# Patient Record
Sex: FEMALE | Race: BLACK OR AFRICAN AMERICAN | Employment: OTHER | URBAN - METROPOLITAN AREA
[De-identification: names, ages, dates, MRNs, and addresses within clinical notes are randomized per-mention and may not be internally consistent; named-entity substitution may affect disease eponyms.]

---

## 2017-07-12 ENCOUNTER — HOSPITAL ENCOUNTER (OUTPATIENT)
Dept: MRI IMAGING | Age: 62
Discharge: HOME | End: 2017-07-12

## 2017-07-12 DIAGNOSIS — M51.35 DDD (DEGENERATIVE DISC DISEASE), THORACOLUMBAR: ICD-10-CM

## 2017-07-12 DIAGNOSIS — M51.35 OTHER INTERVERTEBRAL DISC DEGENERATION, THORACOLUMBAR REGION: ICD-10-CM

## 2018-05-24 ENCOUNTER — OFFICE VISIT (OUTPATIENT)
Dept: FAMILY MEDICINE CLINIC | Age: 63
End: 2018-05-24

## 2018-05-24 VITALS
HEIGHT: 61 IN | HEART RATE: 106 BPM | WEIGHT: 177.6 LBS | OXYGEN SATURATION: 97 % | BODY MASS INDEX: 33.53 KG/M2 | DIASTOLIC BLOOD PRESSURE: 82 MMHG | SYSTOLIC BLOOD PRESSURE: 135 MMHG

## 2018-05-24 DIAGNOSIS — Z12.31 ENCOUNTER FOR SCREENING MAMMOGRAM FOR BREAST CANCER: ICD-10-CM

## 2018-05-24 DIAGNOSIS — R13.10 DYSPHAGIA, UNSPECIFIED TYPE: Primary | ICD-10-CM

## 2018-05-24 DIAGNOSIS — L30.9 DERMATITIS: ICD-10-CM

## 2018-05-24 DIAGNOSIS — Z13.220 LIPID SCREENING: ICD-10-CM

## 2018-05-24 DIAGNOSIS — F17.200 TOBACCO USE DISORDER: ICD-10-CM

## 2018-05-24 DIAGNOSIS — R53.83 FATIGUE, UNSPECIFIED TYPE: ICD-10-CM

## 2018-05-24 DIAGNOSIS — Z23 NEED FOR PNEUMOCOCCAL VACCINATION: ICD-10-CM

## 2018-05-24 PROCEDURE — 99203 OFFICE O/P NEW LOW 30 MIN: CPT | Performed by: FAMILY MEDICINE

## 2018-05-24 RX ORDER — OMEPRAZOLE 20 MG/1
20 CAPSULE, DELAYED RELEASE ORAL 2 TIMES DAILY
Qty: 30 CAPSULE | Refills: 3 | Status: SHIPPED | OUTPATIENT
Start: 2018-05-24 | End: 2018-09-17 | Stop reason: DRUGHIGH

## 2018-05-24 RX ORDER — TRIAMCINOLONE ACETONIDE 1 MG/G
CREAM TOPICAL
Qty: 60 G | Refills: 0 | Status: SHIPPED | OUTPATIENT
Start: 2018-05-24 | End: 2018-09-24

## 2018-05-24 ASSESSMENT — ENCOUNTER SYMPTOMS
BLOOD IN STOOL: 0
APNEA: 0
RHINORRHEA: 0
ABDOMINAL PAIN: 0
SINUS PRESSURE: 0
WHEEZING: 0
TROUBLE SWALLOWING: 1
VOMITING: 1
SORE THROAT: 0
SHORTNESS OF BREATH: 0
STRIDOR: 0
VOICE CHANGE: 0
DIARRHEA: 0
NAUSEA: 1
CONSTIPATION: 0
SINUS PAIN: 0
COUGH: 0

## 2018-05-24 ASSESSMENT — PATIENT HEALTH QUESTIONNAIRE - PHQ9
1. LITTLE INTEREST OR PLEASURE IN DOING THINGS: 0
SUM OF ALL RESPONSES TO PHQ9 QUESTIONS 1 & 2: 0
SUM OF ALL RESPONSES TO PHQ QUESTIONS 1-9: 0
2. FEELING DOWN, DEPRESSED OR HOPELESS: 0

## 2018-05-29 ENCOUNTER — HOSPITAL ENCOUNTER (OUTPATIENT)
Dept: GENERAL RADIOLOGY | Age: 63
Discharge: HOME | End: 2018-05-29
Attending: FAMILY MEDICINE | Admitting: FAMILY MEDICINE

## 2018-05-29 DIAGNOSIS — Z12.31 ENCOUNTER FOR SCREENING MAMMOGRAM FOR MALIGNANT NEOPLASM OF BREAST: ICD-10-CM

## 2018-05-29 DIAGNOSIS — R13.10 PROBLEMS WITH SWALLOWING AND MASTICATION: ICD-10-CM

## 2018-05-30 ENCOUNTER — HOSPITAL ENCOUNTER (OUTPATIENT)
Dept: WOMENS IMAGING | Age: 63
Discharge: HOME | End: 2018-05-30
Attending: FAMILY MEDICINE | Admitting: FAMILY MEDICINE

## 2018-05-30 DIAGNOSIS — Z12.31 ENCOUNTER FOR SCREENING MAMMOGRAM FOR MALIGNANT NEOPLASM OF BREAST: ICD-10-CM

## 2018-05-30 DIAGNOSIS — Z12.31 ENCOUNTER FOR SCREENING MAMMOGRAM FOR BREAST CANCER: ICD-10-CM

## 2018-06-01 ENCOUNTER — HOSPITAL ENCOUNTER (OUTPATIENT)
Dept: LAB | Age: 63
Discharge: HOME | End: 2018-06-01
Attending: FAMILY MEDICINE | Admitting: FAMILY MEDICINE

## 2018-06-01 LAB
ALBUMIN SERPL-MCNC: 4.3 GM/DL (ref 3.4–5)
ALP BLD-CCNC: 101 IU/L (ref 40–128)
ALT SERPL-CCNC: 11 U/L (ref 10–40)
ANION GAP SERPL CALCULATED.3IONS-SCNC: 17 MMOL/L (ref 4–16)
AST SERPL-CCNC: 27 IU/L (ref 15–37)
BASOPHILS ABSOLUTE: 0 K/CU MM
BASOPHILS RELATIVE PERCENT: 0.5 % (ref 0–1)
BILIRUB SERPL-MCNC: 0.7 MG/DL (ref 0–1)
BUN BLDV-MCNC: 10 MG/DL (ref 6–23)
CALCIUM SERPL-MCNC: 9.4 MG/DL (ref 8.3–10.6)
CHLORIDE BLD-SCNC: 101 MMOL/L (ref 99–110)
CHOLESTEROL: 243 MG/DL
CO2: 24 MMOL/L (ref 21–32)
CREAT SERPL-MCNC: 0.8 MG/DL (ref 0.6–1.1)
DIFFERENTIAL TYPE: ABNORMAL
EOSINOPHILS ABSOLUTE: 0.1 K/CU MM
EOSINOPHILS RELATIVE PERCENT: 1.2 % (ref 0–3)
GFR AFRICAN AMERICAN: >60 ML/MIN/1.73M2
GFR NON-AFRICAN AMERICAN: >60 ML/MIN/1.73M2
GLUCOSE BLD-MCNC: 99 MG/DL (ref 70–99)
HCT VFR BLD CALC: 28.4 % (ref 37–47)
HDLC SERPL-MCNC: 148 MG/DL
HEMOGLOBIN: 8.7 GM/DL (ref 12.5–16)
IMMATURE NEUTROPHIL %: 0.2 % (ref 0–0.43)
LDL CHOLESTEROL CALCULATED: 56 MG/DL
LYMPHOCYTES ABSOLUTE: 2.9 K/CU MM
LYMPHOCYTES RELATIVE PERCENT: 36.7 % (ref 24–44)
MCH RBC QN AUTO: 30.9 PG (ref 27–31)
MCHC RBC AUTO-ENTMCNC: 30.6 % (ref 32–36)
MCV RBC AUTO: 100.7 FL (ref 78–100)
MONOCYTES ABSOLUTE: 0.7 K/CU MM
MONOCYTES RELATIVE PERCENT: 8.6 % (ref 0–4)
NUCLEATED RBC %: 0 %
PDW BLD-RTO: 18.2 % (ref 11.7–14.9)
PLATELET # BLD: 503 K/CU MM (ref 140–440)
PMV BLD AUTO: 9.3 FL (ref 7.5–11.1)
POTASSIUM SERPL-SCNC: 3.5 MMOL/L (ref 3.5–5.1)
RBC # BLD: 2.82 M/CU MM (ref 4.2–5.4)
SEGMENTED NEUTROPHILS ABSOLUTE COUNT: 4.2 K/CU MM
SEGMENTED NEUTROPHILS RELATIVE PERCENT: 52.8 % (ref 36–66)
SODIUM BLD-SCNC: 142 MMOL/L (ref 135–145)
T4 FREE: 1 NG/DL (ref 0.9–1.8)
TOTAL IMMATURE NEUTOROPHIL: 0.02 K/CU MM
TOTAL NUCLEATED RBC: 0 K/CU MM
TOTAL PROTEIN: 7.2 GM/DL (ref 6.4–8.2)
TRIGL SERPL-MCNC: 196 MG/DL
TSH HIGH SENSITIVITY: 2.71 UIU/ML (ref 0.27–4.2)
WBC # BLD: 8 K/CU MM (ref 4–10.5)

## 2018-06-05 ENCOUNTER — OFFICE VISIT (OUTPATIENT)
Dept: FAMILY MEDICINE CLINIC | Age: 63
End: 2018-06-05

## 2018-06-05 VITALS
DIASTOLIC BLOOD PRESSURE: 98 MMHG | OXYGEN SATURATION: 98 % | BODY MASS INDEX: 33.37 KG/M2 | SYSTOLIC BLOOD PRESSURE: 172 MMHG | HEART RATE: 93 BPM | WEIGHT: 176.6 LBS

## 2018-06-05 DIAGNOSIS — Z12.11 SCREEN FOR COLON CANCER: ICD-10-CM

## 2018-06-05 DIAGNOSIS — I10 ESSENTIAL HYPERTENSION: ICD-10-CM

## 2018-06-05 DIAGNOSIS — D64.9 ANEMIA, UNSPECIFIED TYPE: ICD-10-CM

## 2018-06-05 DIAGNOSIS — M79.671 FOOT PAIN, BILATERAL: Primary | ICD-10-CM

## 2018-06-05 DIAGNOSIS — M79.672 FOOT PAIN, BILATERAL: Primary | ICD-10-CM

## 2018-06-05 DIAGNOSIS — M72.2 PLANTAR FASCIITIS, BILATERAL: ICD-10-CM

## 2018-06-05 PROCEDURE — 99214 OFFICE O/P EST MOD 30 MIN: CPT | Performed by: FAMILY MEDICINE

## 2018-06-05 RX ORDER — OMEPRAZOLE 40 MG/1
40 CAPSULE, DELAYED RELEASE ORAL DAILY
COMMUNITY
Start: 2018-05-29 | End: 2018-10-04 | Stop reason: SDUPTHER

## 2018-06-05 RX ORDER — HYDROCHLOROTHIAZIDE 25 MG/1
25 TABLET ORAL DAILY
Qty: 30 TABLET | Refills: 3 | Status: SHIPPED | OUTPATIENT
Start: 2018-06-05 | End: 2019-01-07

## 2018-06-10 ASSESSMENT — ENCOUNTER SYMPTOMS
COUGH: 0
SHORTNESS OF BREATH: 0

## 2018-09-17 PROBLEM — J18.9 PNEUMONIA: Status: ACTIVE | Noted: 2018-09-17

## 2018-09-18 ENCOUNTER — TELEPHONE (OUTPATIENT)
Dept: FAMILY MEDICINE CLINIC | Age: 63
End: 2018-09-18

## 2018-09-18 PROBLEM — G93.41 METABOLIC ENCEPHALOPATHY: Status: ACTIVE | Noted: 2018-09-18

## 2018-09-18 PROBLEM — J18.9 PNEUMONIA: Status: RESOLVED | Noted: 2018-09-17 | Resolved: 2018-09-18

## 2018-09-18 PROBLEM — E72.20 HYPERAMMONEMIA (HCC): Status: ACTIVE | Noted: 2018-09-18

## 2018-09-18 PROBLEM — N39.0 COMPLICATED UTI (URINARY TRACT INFECTION): Status: ACTIVE | Noted: 2018-09-18

## 2018-09-19 PROBLEM — G93.41 METABOLIC ENCEPHALOPATHY: Status: RESOLVED | Noted: 2018-09-18 | Resolved: 2018-09-19

## 2018-09-19 PROBLEM — E72.20 HYPERAMMONEMIA (HCC): Status: RESOLVED | Noted: 2018-09-18 | Resolved: 2018-09-19

## 2018-09-19 PROBLEM — J18.9 PNEUMONIA: Status: ACTIVE | Noted: 2018-09-19

## 2018-09-24 ENCOUNTER — APPOINTMENT (OUTPATIENT)
Dept: GENERAL RADIOLOGY | Age: 63
DRG: 689 | End: 2018-09-24
Payer: MEDICARE

## 2018-09-24 ENCOUNTER — APPOINTMENT (OUTPATIENT)
Dept: CT IMAGING | Age: 63
DRG: 689 | End: 2018-09-24
Payer: MEDICARE

## 2018-09-24 ENCOUNTER — HOSPITAL ENCOUNTER (INPATIENT)
Age: 63
LOS: 4 days | Discharge: HOME | DRG: 689 | End: 2018-09-28
Attending: EMERGENCY MEDICINE | Admitting: INTERNAL MEDICINE
Payer: MEDICARE

## 2018-09-24 DIAGNOSIS — R41.82 ALTERED MENTAL STATUS, UNSPECIFIED ALTERED MENTAL STATUS TYPE: Primary | ICD-10-CM

## 2018-09-24 DIAGNOSIS — E83.42 HYPOMAGNESEMIA: ICD-10-CM

## 2018-09-24 DIAGNOSIS — E87.20 LACTIC ACID ACIDOSIS: ICD-10-CM

## 2018-09-24 PROBLEM — G93.40 ENCEPHALOPATHY ACUTE: Status: ACTIVE | Noted: 2018-09-24

## 2018-09-24 LAB
ALBUMIN SERPL-MCNC: 3.7 GM/DL (ref 3.4–5)
ALCOHOL SCREEN SERUM: <0.01 %WT/VOL
ALP BLD-CCNC: 105 IU/L (ref 40–129)
ALT SERPL-CCNC: 33 U/L (ref 10–40)
AMMONIA: 30 UMOL/L (ref 11–51)
AMPHETAMINES: NEGATIVE
ANION GAP SERPL CALCULATED.3IONS-SCNC: 18 MMOL/L (ref 4–16)
AST SERPL-CCNC: 58 IU/L (ref 15–37)
BACTERIA: ABNORMAL /HPF
BARBITURATE SCREEN URINE: NEGATIVE
BASE EXCESS: 2 (ref 0–2.4)
BASOPHILS ABSOLUTE: 0.1 K/CU MM
BASOPHILS RELATIVE PERCENT: 0.5 % (ref 0–1)
BENZODIAZEPINE SCREEN, URINE: NEGATIVE
BILIRUB SERPL-MCNC: 0.6 MG/DL (ref 0–1)
BILIRUBIN URINE: NEGATIVE MG/DL
BLOOD, URINE: NEGATIVE
BUN BLDV-MCNC: 9 MG/DL (ref 6–23)
CALCIUM SERPL-MCNC: 9.2 MG/DL (ref 8.3–10.6)
CANNABINOID SCREEN URINE: NEGATIVE
CARBON MONOXIDE, BLOOD: 2.1 % (ref 0–5)
CHLORIDE BLD-SCNC: 95 MMOL/L (ref 99–110)
CLARITY: ABNORMAL
CO2 CONTENT: 22.9 MMOL/L (ref 19–24)
CO2: 21 MMOL/L (ref 21–32)
COCAINE METABOLITE: NEGATIVE
COLOR: YELLOW
CREAT SERPL-MCNC: 1.3 MG/DL (ref 0.6–1.1)
DIFFERENTIAL TYPE: ABNORMAL
EOSINOPHILS ABSOLUTE: 0.3 K/CU MM
EOSINOPHILS RELATIVE PERCENT: 3 % (ref 0–3)
GFR AFRICAN AMERICAN: 50 ML/MIN/1.73M2
GFR NON-AFRICAN AMERICAN: 42 ML/MIN/1.73M2
GLUCOSE BLD-MCNC: 118 MG/DL (ref 70–99)
GLUCOSE, URINE: NEGATIVE MG/DL
HCO3 ARTERIAL: 21.9 MMOL/L (ref 18–23)
HCT VFR BLD CALC: 31.3 % (ref 37–47)
HEMOGLOBIN: 10.6 GM/DL (ref 12.5–16)
HYALINE CASTS: 2 /LPF
IMMATURE NEUTROPHIL %: 0.8 % (ref 0–0.43)
INR BLD: 1.07 INDEX
KETONES, URINE: NEGATIVE MG/DL
LACTATE: 1.3 MMOL/L (ref 0.4–2)
LACTATE: 2.3 MMOL/L (ref 0.4–2)
LACTATE: 2.4 MMOL/L (ref 0.4–2)
LEUKOCYTE ESTERASE, URINE: ABNORMAL
LYMPHOCYTES ABSOLUTE: 1.1 K/CU MM
LYMPHOCYTES RELATIVE PERCENT: 10.9 % (ref 24–44)
MAGNESIUM: 1.3 MG/DL (ref 1.8–2.4)
MCH RBC QN AUTO: 32.6 PG (ref 27–31)
MCHC RBC AUTO-ENTMCNC: 33.9 % (ref 32–36)
MCV RBC AUTO: 96.3 FL (ref 78–100)
METHEMOGLOBIN ARTERIAL: 0.7 %
MONOCYTES ABSOLUTE: 0.8 K/CU MM
MONOCYTES RELATIVE PERCENT: 8.2 % (ref 0–4)
MUCUS: ABNORMAL HPF
NITRITE URINE, QUANTITATIVE: NEGATIVE
NUCLEATED RBC %: 0 %
O2 SATURATION: 95.9 % (ref 96–97)
OPIATES, URINE: NEGATIVE
OXYCODONE: NEGATIVE
PCO2 ARTERIAL: 33 MMHG (ref 32–45)
PDW BLD-RTO: 14.3 % (ref 11.7–14.9)
PH BLOOD: 7.43 (ref 7.34–7.45)
PH, URINE: 5 (ref 5–8)
PHENCYCLIDINE, URINE: NEGATIVE
PLATELET # BLD: 352 K/CU MM (ref 140–440)
PMV BLD AUTO: 10.2 FL (ref 7.5–11.1)
PO2 ARTERIAL: 80 MMHG (ref 75–100)
POTASSIUM SERPL-SCNC: 3.6 MMOL/L (ref 3.5–5.1)
PROTEIN UA: 30 MG/DL
PROTHROMBIN TIME: 12.2 SECONDS (ref 9.12–12.5)
RBC # BLD: 3.25 M/CU MM (ref 4.2–5.4)
RBC URINE: <1 /HPF (ref 0–6)
SEGMENTED NEUTROPHILS ABSOLUTE COUNT: 7.8 K/CU MM
SEGMENTED NEUTROPHILS RELATIVE PERCENT: 76.6 % (ref 36–66)
SODIUM BLD-SCNC: 134 MMOL/L (ref 135–145)
SPECIFIC GRAVITY UA: 1.01 (ref 1–1.03)
SQUAMOUS EPITHELIAL: 14 /HPF
TOTAL IMMATURE NEUTOROPHIL: 0.08 K/CU MM
TOTAL NUCLEATED RBC: 0 K/CU MM
TOTAL PROTEIN: 7.3 GM/DL (ref 6.4–8.2)
TRICHOMONAS: ABNORMAL /HPF
TROPONIN T: <0.01 NG/ML
TROPONIN T: <0.01 NG/ML
UROBILINOGEN, URINE: NORMAL MG/DL (ref 0.2–1)
WBC # BLD: 10.2 K/CU MM (ref 4–10.5)
WBC UA: 3 /HPF (ref 0–5)

## 2018-09-24 PROCEDURE — 94761 N-INVAS EAR/PLS OXIMETRY MLT: CPT

## 2018-09-24 PROCEDURE — 2500000003 HC RX 250 WO HCPCS: Performed by: EMERGENCY MEDICINE

## 2018-09-24 PROCEDURE — 83735 ASSAY OF MAGNESIUM: CPT

## 2018-09-24 PROCEDURE — 85610 PROTHROMBIN TIME: CPT

## 2018-09-24 PROCEDURE — 2580000003 HC RX 258: Performed by: EMERGENCY MEDICINE

## 2018-09-24 PROCEDURE — 83605 ASSAY OF LACTIC ACID: CPT

## 2018-09-24 PROCEDURE — 87040 BLOOD CULTURE FOR BACTERIA: CPT

## 2018-09-24 PROCEDURE — 96366 THER/PROPH/DIAG IV INF ADDON: CPT

## 2018-09-24 PROCEDURE — 36415 COLL VENOUS BLD VENIPUNCTURE: CPT

## 2018-09-24 PROCEDURE — 96365 THER/PROPH/DIAG IV INF INIT: CPT

## 2018-09-24 PROCEDURE — 93005 ELECTROCARDIOGRAM TRACING: CPT | Performed by: EMERGENCY MEDICINE

## 2018-09-24 PROCEDURE — 70450 CT HEAD/BRAIN W/O DYE: CPT

## 2018-09-24 PROCEDURE — 85025 COMPLETE CBC W/AUTO DIFF WBC: CPT

## 2018-09-24 PROCEDURE — 81001 URINALYSIS AUTO W/SCOPE: CPT

## 2018-09-24 PROCEDURE — 71046 X-RAY EXAM CHEST 2 VIEWS: CPT

## 2018-09-24 PROCEDURE — 96375 TX/PRO/DX INJ NEW DRUG ADDON: CPT

## 2018-09-24 PROCEDURE — G0480 DRUG TEST DEF 1-7 CLASSES: HCPCS

## 2018-09-24 PROCEDURE — 99285 EMERGENCY DEPT VISIT HI MDM: CPT

## 2018-09-24 PROCEDURE — 36600 WITHDRAWAL OF ARTERIAL BLOOD: CPT

## 2018-09-24 PROCEDURE — 6360000002 HC RX W HCPCS: Performed by: EMERGENCY MEDICINE

## 2018-09-24 PROCEDURE — 82140 ASSAY OF AMMONIA: CPT

## 2018-09-24 PROCEDURE — 82803 BLOOD GASES ANY COMBINATION: CPT

## 2018-09-24 PROCEDURE — 1200000000 HC SEMI PRIVATE

## 2018-09-24 PROCEDURE — 2580000003 HC RX 258: Performed by: NURSE PRACTITIONER

## 2018-09-24 PROCEDURE — 84484 ASSAY OF TROPONIN QUANT: CPT

## 2018-09-24 PROCEDURE — 80053 COMPREHEN METABOLIC PANEL: CPT

## 2018-09-24 PROCEDURE — 6360000002 HC RX W HCPCS: Performed by: NURSE PRACTITIONER

## 2018-09-24 PROCEDURE — 6370000000 HC RX 637 (ALT 250 FOR IP): Performed by: NURSE PRACTITIONER

## 2018-09-24 RX ORDER — POTASSIUM CHLORIDE 7.45 MG/ML
10 INJECTION INTRAVENOUS PRN
Status: DISCONTINUED | OUTPATIENT
Start: 2018-09-24 | End: 2018-09-28 | Stop reason: HOSPADM

## 2018-09-24 RX ORDER — ACETAMINOPHEN 325 MG/1
650 TABLET ORAL EVERY 4 HOURS PRN
Status: DISCONTINUED | OUTPATIENT
Start: 2018-09-24 | End: 2018-09-28 | Stop reason: HOSPADM

## 2018-09-24 RX ORDER — MULTIVITAMIN WITH FOLIC ACID 400 MCG
1 TABLET ORAL DAILY
Status: DISCONTINUED | OUTPATIENT
Start: 2018-09-24 | End: 2018-09-28 | Stop reason: HOSPADM

## 2018-09-24 RX ORDER — SODIUM CHLORIDE 9 MG/ML
INJECTION, SOLUTION INTRAVENOUS CONTINUOUS
Status: DISCONTINUED | OUTPATIENT
Start: 2018-09-24 | End: 2018-09-28 | Stop reason: HOSPADM

## 2018-09-24 RX ORDER — MAGNESIUM SULFATE 1 G/100ML
1 INJECTION INTRAVENOUS PRN
Status: DISCONTINUED | OUTPATIENT
Start: 2018-09-24 | End: 2018-09-28 | Stop reason: HOSPADM

## 2018-09-24 RX ORDER — ONDANSETRON 2 MG/ML
4 INJECTION INTRAMUSCULAR; INTRAVENOUS EVERY 6 HOURS PRN
Status: DISCONTINUED | OUTPATIENT
Start: 2018-09-24 | End: 2018-09-28 | Stop reason: HOSPADM

## 2018-09-24 RX ORDER — LORAZEPAM 2 MG/ML
0.5 INJECTION INTRAMUSCULAR EVERY 8 HOURS PRN
Status: DISCONTINUED | OUTPATIENT
Start: 2018-09-24 | End: 2018-09-28 | Stop reason: HOSPADM

## 2018-09-24 RX ORDER — PANTOPRAZOLE SODIUM 40 MG/1
40 TABLET, DELAYED RELEASE ORAL
Status: DISCONTINUED | OUTPATIENT
Start: 2018-09-25 | End: 2018-09-28 | Stop reason: HOSPADM

## 2018-09-24 RX ORDER — POTASSIUM CHLORIDE 20MEQ/15ML
40 LIQUID (ML) ORAL PRN
Status: DISCONTINUED | OUTPATIENT
Start: 2018-09-24 | End: 2018-09-28 | Stop reason: HOSPADM

## 2018-09-24 RX ORDER — DOCUSATE SODIUM 100 MG/1
100 CAPSULE, LIQUID FILLED ORAL 2 TIMES DAILY
Status: DISCONTINUED | OUTPATIENT
Start: 2018-09-24 | End: 2018-09-28 | Stop reason: HOSPADM

## 2018-09-24 RX ORDER — SODIUM CHLORIDE 0.9 % (FLUSH) 0.9 %
10 SYRINGE (ML) INJECTION PRN
Status: DISCONTINUED | OUTPATIENT
Start: 2018-09-24 | End: 2018-09-28 | Stop reason: HOSPADM

## 2018-09-24 RX ORDER — MAGNESIUM SULFATE IN WATER 40 MG/ML
2 INJECTION, SOLUTION INTRAVENOUS ONCE
Status: COMPLETED | OUTPATIENT
Start: 2018-09-24 | End: 2018-09-24

## 2018-09-24 RX ORDER — THIAMINE MONONITRATE (VIT B1) 100 MG
100 TABLET ORAL DAILY
Status: DISCONTINUED | OUTPATIENT
Start: 2018-09-24 | End: 2018-09-28 | Stop reason: HOSPADM

## 2018-09-24 RX ORDER — THIAMINE HYDROCHLORIDE 100 MG/ML
100 INJECTION, SOLUTION INTRAMUSCULAR; INTRAVENOUS ONCE
Status: COMPLETED | OUTPATIENT
Start: 2018-09-24 | End: 2018-09-24

## 2018-09-24 RX ORDER — POTASSIUM CHLORIDE 20 MEQ/1
40 TABLET, EXTENDED RELEASE ORAL PRN
Status: DISCONTINUED | OUTPATIENT
Start: 2018-09-24 | End: 2018-09-28 | Stop reason: HOSPADM

## 2018-09-24 RX ORDER — FOLIC ACID 5 MG/ML
1 INJECTION, SOLUTION INTRAMUSCULAR; INTRAVENOUS; SUBCUTANEOUS ONCE
Status: COMPLETED | OUTPATIENT
Start: 2018-09-24 | End: 2018-09-24

## 2018-09-24 RX ORDER — SODIUM CHLORIDE 0.9 % (FLUSH) 0.9 %
10 SYRINGE (ML) INJECTION EVERY 12 HOURS SCHEDULED
Status: DISCONTINUED | OUTPATIENT
Start: 2018-09-24 | End: 2018-09-28 | Stop reason: HOSPADM

## 2018-09-24 RX ORDER — FOLIC ACID 1 MG/1
1 TABLET ORAL DAILY
Status: DISCONTINUED | OUTPATIENT
Start: 2018-09-24 | End: 2018-09-28 | Stop reason: HOSPADM

## 2018-09-24 RX ORDER — 0.9 % SODIUM CHLORIDE 0.9 %
1000 INTRAVENOUS SOLUTION INTRAVENOUS ONCE
Status: COMPLETED | OUTPATIENT
Start: 2018-09-24 | End: 2018-09-24

## 2018-09-24 RX ADMIN — FOLIC ACID 1 MG: 1 TABLET ORAL at 17:53

## 2018-09-24 RX ADMIN — Medication 100 MG: at 17:53

## 2018-09-24 RX ADMIN — SODIUM CHLORIDE: 9 INJECTION, SOLUTION INTRAVENOUS at 17:51

## 2018-09-24 RX ADMIN — MAGNESIUM SULFATE HEPTAHYDRATE 2 G: 40 INJECTION, SOLUTION INTRAVENOUS at 12:47

## 2018-09-24 RX ADMIN — THERA TABS 1 TABLET: TAB at 17:53

## 2018-09-24 RX ADMIN — THIAMINE HYDROCHLORIDE 100 MG: 100 INJECTION, SOLUTION INTRAMUSCULAR; INTRAVENOUS at 12:47

## 2018-09-24 RX ADMIN — ACETAMINOPHEN 650 MG: 325 TABLET ORAL at 17:53

## 2018-09-24 RX ADMIN — DOCUSATE SODIUM 100 MG: 100 CAPSULE, LIQUID FILLED ORAL at 20:16

## 2018-09-24 RX ADMIN — ENOXAPARIN SODIUM 40 MG: 40 INJECTION SUBCUTANEOUS at 17:53

## 2018-09-24 RX ADMIN — FOLIC ACID 1 MG: 5 INJECTION, SOLUTION INTRAMUSCULAR; INTRAVENOUS; SUBCUTANEOUS at 12:43

## 2018-09-24 RX ADMIN — SODIUM CHLORIDE 1000 ML: 900 INJECTION INTRAVENOUS at 12:23

## 2018-09-24 ASSESSMENT — PAIN DESCRIPTION - DESCRIPTORS
DESCRIPTORS: ACHING
DESCRIPTORS: ACHING

## 2018-09-24 ASSESSMENT — PAIN SCALES - GENERAL
PAINLEVEL_OUTOF10: 4
PAINLEVEL_OUTOF10: 0
PAINLEVEL_OUTOF10: 5
PAINLEVEL_OUTOF10: 3

## 2018-09-24 ASSESSMENT — PAIN DESCRIPTION - PAIN TYPE
TYPE: ACUTE PAIN
TYPE: ACUTE PAIN

## 2018-09-24 ASSESSMENT — PAIN DESCRIPTION - LOCATION
LOCATION: LEG
LOCATION: LEG

## 2018-09-24 ASSESSMENT — PAIN DESCRIPTION - ORIENTATION
ORIENTATION: RIGHT;LEFT
ORIENTATION: RIGHT;LEFT

## 2018-09-24 ASSESSMENT — PAIN DESCRIPTION - FREQUENCY
FREQUENCY: CONTINUOUS
FREQUENCY: CONTINUOUS

## 2018-09-24 NOTE — ED PROVIDER NOTES
rash  Neurologic:  Alert & oriented. Vision is intact. Pupils are equal, round and reactive to light bilaterally, EOM are intact, face is symmetrical, tongue is midline. Motor function and sensation are grossly intact. There is no pronator drift. Finger to nose is normal. Lower extremity reflexes are +2. No trunchal ataxia.  Speech is normal.  No asterixis  Psychiatric:  Affect normal, Mood normal.       Labs:  Labs Reviewed   CBC WITH AUTO DIFFERENTIAL - Abnormal; Notable for the following:        Result Value    RBC 3.25 (*)     Hemoglobin 10.6 (*)     Hematocrit 31.3 (*)     MCH 32.6 (*)     Segs Relative 76.6 (*)     Lymphocytes % 10.9 (*)     Monocytes % 8.2 (*)     Immature Neutrophil % 0.8 (*)     All other components within normal limits   COMPREHENSIVE METABOLIC PANEL - Abnormal; Notable for the following:     Sodium 134 (*)     Chloride 95 (*)     CREATININE 1.3 (*)     Glucose 118 (*)     AST 58 (*)     GFR Non- 42 (*)     GFR  50 (*)     Anion Gap 18 (*)     All other components within normal limits   URINALYSIS - Abnormal; Notable for the following:     Clarity, UA HAZY (*)     Protein, UA 30 (*)     Leukocyte Esterase, Urine TRACE (*)     Bacteria, UA OCCASIONAL (*)     Mucus, UA RARE (*)     All other components within normal limits   LACTIC ACID, PLASMA - Abnormal; Notable for the following:     Lactate 2.3 (*)     All other components within normal limits    Narrative:     [facility] CALLED TO Matt Kim RN ON 09/24/2018 @ 1137 BY PAMELLA LEACH  RESULTS READ BACK   MAGNESIUM - Abnormal; Notable for the following:     Magnesium 1.3 (*)     All other components within normal limits   LACTIC ACID, PLASMA - Abnormal; Notable for the following:     Lactate 2.4 (*)     All other components within normal limits   CULTURE BLOOD #1   CULTURE BLOOD #1   TROPONIN    Narrative:     (NOTE)  PATIENTS WITH HIGH LEVELS OF BIOTIN ORAL INTAKE (ie >5mg/day) MAY  HAVE FALSELY DECREASED TROPONIN T LEVELS. SAMPLES COLLECETED  WITHIN 8 HOURS OF BIOTIN INTAKE MAY REQUIRE ADDITIONAL  INFORMATION FOR DIAGNOSIS. ETHANOL    Narrative:     THE VALUE IS BELOW OUR DETECTION LIMIT. PROTIME-INR    Narrative:     Protime seconds can vary  due to reagent sensitivity. Please use INR to monitor  oral anticoagulants. (NOTE)  Therapeutic Range                                              Indications:                                                INR  Most (DVT, PE, Atrial Fibrillation, Bioprosthetic         2.0-3.0  valve, [place] Judes bicuspid aortic valve)    Most mechanical valves, recurrent thrombosis              2.5-3.5     AMMONIA   URINE DRUG SCREEN    Narrative:             THRESHOLD CONCENTRATIONS (mg/dL)  AMPHT               1000  LOUIE,OPIA             300  BZO,BAR              200  PCP                   25  THC                   50  OXY                  100          IF POSITIVE, SPECIMEN WILL BE  DISCARDED AFTER 6 MONTHS. CALL LAB IF CONFIRMATION NEEDED. ALL NEGATIVE SPECIMENS WILL BE  DISCARDED AFTER ONE WEEK. * UNCONFIRMED POSITIVES MAY  NOT MEET FORENSIC REQUIREMENTS. LACTIC ACID, PLASMA         EKG    This EKG was interpreted by me. Rate is 113, rhythm is sinus. Occasional PACs. WV and QT intervals are within normal limits. QTC is 499. There is no ST segment or T wave changes. This EKG was compared to previous EKG from date 9/17/18    RADIOLOGY    Xr Chest Standard (2 Vw)    Result Date: 9/24/2018  EXAMINATION: TWO VIEWS OF THE CHEST 9/24/2018 11:01 am COMPARISON: Prior exams dating back to at least 2012 HISTORY: [facility] [place]: Chest pain TECHNOLOGIST PROVIDED HISTORY: Reason for exam:->Chest pain Ordering Physician Provided Reason for Exam: increased confusion Acuity: Chronic Type of Exam: Ongoing FINDINGS: The lungs are without acute focal process. There is no effusion or pneumothorax. The cardiomediastinal silhouette is without acute process.  The osseous

## 2018-09-24 NOTE — H&P
History and Physical      Name:  Channing Felton /Age/Sex: 1955  (59 y.o. female)   MRN & CSN:  [id number] & [id number] Admission Date/Time: 2018 10:25 AM   Location:  ED[facility]/ED-[facility] PCP: Lucky Apgar, MD       Hospital Day: 1        Admitting Physician: Dr. Kathy Berry MD.     Assessment and Plan:   Channing Felton is a 59 y.o. female who presents with  Altered Mental Status; Fall (daughter at bedside reports pt fell in parking lot ); and Abrasion (right knee s/p fall)     Encephalopathy acute- resolved at present evaluation. Sshe presents tachycardic(110s)/ hypotensive,lactic acidosis (2.4)- concern for infectious etiology given recent UTI with incomplete treatment. CT head non acute. CXR non acute. Neg UDS. Alcohol withdrawal also consideration. Admit to Med/Surg under observation    Neuro checks   Pending repeat cultures    Repeat lactic acid   CIWA scale- notify if scoring. Will give TID prn ativan for now. Pending ABG     Essential hypertension- Holding as currently hypotensive. Monitor BP trends resume as tolerated. Diet Cardiac   DVT Prophylaxis [x] Lovenox, []  Heparin, [] SCDs, [] Ambulation  [] Long term AC   GI Prophylaxis [x] PPI,  [] H2 Blocker,  [] Carafate,  [] Diet/Tube Feeds   Code Status Full     Disposition Admit to observation . Patient plans to return home upon discharge   MDM [] Low, [x] Moderate,[]  High     -Patient assessment and plan discussed with supervising physician-  History of Present Illness:     Chief Complaint: Altered Mental Status; Fall (daughter at bedside reports pt fell in parking lot ); and Abrasion (right knee s/p fall)    Channing Felton is a 59 y.o. female who presents with daughter who is concerned about waxing and waning altered mental status. She states that after discharge from this facility on () r/t treatment for AMS 2/2 UTI she had several episodes.  Describes one episode that with neighbors assistance she was given medication including Levaquin but after 2 days medications disappeared to which the patient reported she took them all. The patient states she feels fine. States \"I know I catch myself saying things sometimes\". The patient's daughter states she has hx of alcohol abuse with previous severe withdrawal symptoms requiring intubation. States the patient had alcohol over weekend and none since then- states low concern but wanted to mention. Another symptom described was intermittent dizziness. The patient states several episodes since discharge. Reports random occurrence requiring her to sit down. Ten point ROS: reviewed negative, unless as noted in above HPI. Objective:   No intake or output data in the 24 hours ending 09/24/18 1509     Vitals:   Vitals:    09/24/18 1333 09/24/18 1402 09/24/18 1502 09/24/18 1507   BP: 107/74 108/66 (!) 90/59 96/62   Pulse: 107 104 105 106   Resp: 17 20 16 18   Temp:       TempSrc:       SpO2: 100% 100% 100% 98%   Weight:       Height:           Physical Exam: 09/24/18     GEN -Awake nontoxic appearing female, sitting upright in bed , NAD. obese body habitus. Appears given age. EYES -Pupils are equally round. No scleral erythema, discharge, or conjunctivitis. HENT -MM are moist. Oral pharynx without exudates, no evidence of thrush. NECK -Supple, no apparent thyromegaly or masses. RESP -CTA, no wheezes, rales or rhonchi. Symmetric chest movement while on RA.  C/V -S1/S2 auscultated. RRR without appreciable M/R/G. No JVD or carotid bruits. Peripheral pulses equal bilaterally and palpable. No peripheral edema. GI -Abdomen is soft non distended and without significant tenderness. No masses or guarding. + BS Rectal exam deferred.  -No CVA tenderness. James catheter is not present. LYMPH-No palpable cervical lymphadenopathy and no hepatosplenomegaly. No petechiae or ecchymoses. MS -No gross joint deformities. SKIN -Normal coloration, warm, dry.   NEURO-Cranial nerves appear grossly

## 2018-09-25 LAB
ALBUMIN SERPL-MCNC: 3.1 GM/DL (ref 3.4–5)
ALP BLD-CCNC: 99 IU/L (ref 40–128)
ALT SERPL-CCNC: 27 U/L (ref 10–40)
AMYLASE: 46 U/L (ref 25–115)
ANION GAP SERPL CALCULATED.3IONS-SCNC: 16 MMOL/L (ref 4–16)
AST SERPL-CCNC: 56 IU/L (ref 15–37)
BASOPHILS ABSOLUTE: 0 K/CU MM
BASOPHILS RELATIVE PERCENT: 0.1 % (ref 0–1)
BILIRUB SERPL-MCNC: 0.4 MG/DL (ref 0–1)
BUN BLDV-MCNC: 16 MG/DL (ref 6–23)
CALCIUM SERPL-MCNC: 8.3 MG/DL (ref 8.3–10.6)
CHLORIDE BLD-SCNC: 103 MMOL/L (ref 99–110)
CO2: 23 MMOL/L (ref 21–32)
CREAT SERPL-MCNC: 1.5 MG/DL (ref 0.6–1.1)
DIFFERENTIAL TYPE: ABNORMAL
EOSINOPHILS ABSOLUTE: 0.1 K/CU MM
EOSINOPHILS RELATIVE PERCENT: 0.9 % (ref 0–3)
GFR AFRICAN AMERICAN: 43 ML/MIN/1.73M2
GFR NON-AFRICAN AMERICAN: 35 ML/MIN/1.73M2
GLUCOSE BLD-MCNC: 111 MG/DL (ref 70–99)
HCT VFR BLD CALC: 25.4 % (ref 37–47)
HEMOGLOBIN: 8.3 GM/DL (ref 12.5–16)
IMMATURE NEUTROPHIL %: 0.4 % (ref 0–0.43)
LIPASE: 58 IU/L (ref 13–60)
LYMPHOCYTES ABSOLUTE: 0.5 K/CU MM
LYMPHOCYTES RELATIVE PERCENT: 3.7 % (ref 24–44)
MAGNESIUM: 1.6 MG/DL (ref 1.8–2.4)
MCH RBC QN AUTO: 31.7 PG (ref 27–31)
MCHC RBC AUTO-ENTMCNC: 32.7 % (ref 32–36)
MCV RBC AUTO: 96.9 FL (ref 78–100)
MONOCYTES ABSOLUTE: 0.6 K/CU MM
MONOCYTES RELATIVE PERCENT: 4.6 % (ref 0–4)
NUCLEATED RBC %: 0 %
PDW BLD-RTO: 14.3 % (ref 11.7–14.9)
PHOSPHORUS: 4 MG/DL (ref 2.5–4.9)
PLATELET # BLD: 282 K/CU MM (ref 140–440)
PMV BLD AUTO: 10.4 FL (ref 7.5–11.1)
POTASSIUM SERPL-SCNC: 3.7 MMOL/L (ref 3.5–5.1)
RBC # BLD: 2.62 M/CU MM (ref 4.2–5.4)
SEGMENTED NEUTROPHILS ABSOLUTE COUNT: 12.6 K/CU MM
SEGMENTED NEUTROPHILS RELATIVE PERCENT: 90.3 % (ref 36–66)
SODIUM BLD-SCNC: 142 MMOL/L (ref 135–145)
TOTAL IMMATURE NEUTOROPHIL: 0.06 K/CU MM
TOTAL NUCLEATED RBC: 0 K/CU MM
TOTAL PROTEIN: 5.3 GM/DL (ref 6.4–8.2)
TROPONIN T: <0.01 NG/ML
TSH HIGH SENSITIVITY: 3.62 UIU/ML (ref 0.27–4.2)
WBC # BLD: 13.9 K/CU MM (ref 4–10.5)

## 2018-09-25 PROCEDURE — 6360000002 HC RX W HCPCS: Performed by: NURSE PRACTITIONER

## 2018-09-25 PROCEDURE — 94761 N-INVAS EAR/PLS OXIMETRY MLT: CPT

## 2018-09-25 PROCEDURE — 84484 ASSAY OF TROPONIN QUANT: CPT

## 2018-09-25 PROCEDURE — 6360000002 HC RX W HCPCS: Performed by: INTERNAL MEDICINE

## 2018-09-25 PROCEDURE — 80050 GENERAL HEALTH PANEL: CPT

## 2018-09-25 PROCEDURE — 2580000003 HC RX 258: Performed by: INTERNAL MEDICINE

## 2018-09-25 PROCEDURE — 82150 ASSAY OF AMYLASE: CPT

## 2018-09-25 PROCEDURE — 83735 ASSAY OF MAGNESIUM: CPT

## 2018-09-25 PROCEDURE — 94150 VITAL CAPACITY TEST: CPT

## 2018-09-25 PROCEDURE — 6370000000 HC RX 637 (ALT 250 FOR IP): Performed by: NURSE PRACTITIONER

## 2018-09-25 PROCEDURE — 1200000000 HC SEMI PRIVATE

## 2018-09-25 PROCEDURE — 36415 COLL VENOUS BLD VENIPUNCTURE: CPT

## 2018-09-25 PROCEDURE — 83690 ASSAY OF LIPASE: CPT

## 2018-09-25 PROCEDURE — 2580000003 HC RX 258: Performed by: NURSE PRACTITIONER

## 2018-09-25 PROCEDURE — 84100 ASSAY OF PHOSPHORUS: CPT

## 2018-09-25 RX ORDER — LORAZEPAM 2 MG/ML
1 INJECTION INTRAMUSCULAR
Status: ACTIVE | OUTPATIENT
Start: 2018-09-25 | End: 2018-09-25

## 2018-09-25 RX ADMIN — DOCUSATE SODIUM 100 MG: 100 CAPSULE, LIQUID FILLED ORAL at 09:41

## 2018-09-25 RX ADMIN — THERA TABS 1 TABLET: TAB at 09:41

## 2018-09-25 RX ADMIN — Medication 100 MG: at 09:41

## 2018-09-25 RX ADMIN — FOLIC ACID 1 MG: 1 TABLET ORAL at 09:41

## 2018-09-25 RX ADMIN — SODIUM CHLORIDE: 9 INJECTION, SOLUTION INTRAVENOUS at 19:08

## 2018-09-25 RX ADMIN — SODIUM CHLORIDE: 9 INJECTION, SOLUTION INTRAVENOUS at 06:23

## 2018-09-25 RX ADMIN — ACETAMINOPHEN 650 MG: 325 TABLET ORAL at 09:41

## 2018-09-25 RX ADMIN — ACETAMINOPHEN 650 MG: 325 TABLET ORAL at 16:02

## 2018-09-25 RX ADMIN — PANTOPRAZOLE SODIUM 40 MG: 40 TABLET, DELAYED RELEASE ORAL at 06:20

## 2018-09-25 RX ADMIN — SODIUM CHLORIDE 1.5 G: 900 INJECTION INTRAVENOUS at 12:59

## 2018-09-25 RX ADMIN — ACETAMINOPHEN 650 MG: 325 TABLET ORAL at 04:08

## 2018-09-25 RX ADMIN — ENOXAPARIN SODIUM 40 MG: 40 INJECTION SUBCUTANEOUS at 09:41

## 2018-09-25 ASSESSMENT — PAIN DESCRIPTION - LOCATION
LOCATION: LEG

## 2018-09-25 ASSESSMENT — PAIN DESCRIPTION - PAIN TYPE
TYPE: ACUTE PAIN

## 2018-09-25 ASSESSMENT — PAIN SCALES - GENERAL
PAINLEVEL_OUTOF10: 0
PAINLEVEL_OUTOF10: 5
PAINLEVEL_OUTOF10: 4

## 2018-09-25 ASSESSMENT — PAIN DESCRIPTION - ONSET: ONSET: ON-GOING

## 2018-09-25 ASSESSMENT — PAIN DESCRIPTION - DESCRIPTORS
DESCRIPTORS: ACHING

## 2018-09-25 ASSESSMENT — PAIN DESCRIPTION - ORIENTATION
ORIENTATION: RIGHT;LEFT
ORIENTATION: RIGHT;LEFT;LOWER
ORIENTATION: RIGHT;LEFT

## 2018-09-25 ASSESSMENT — PAIN DESCRIPTION - FREQUENCY
FREQUENCY: CONTINUOUS
FREQUENCY: CONTINUOUS

## 2018-09-25 NOTE — CONSULTS
16                  09/25/2018                 LABALBU                  3.1                 09/25/2018                 CREATININE               1.5                 09/25/2018                 CALCIUM                  8.3                 09/25/2018                 GFRAA                    43                  09/25/2018                 LABGLOM                  35                  09/25/2018                 GLUCOSE                  111                 09/25/2018            PT/INR:  Lab Results       Component                Value               Date                       PROTIME                  12.2                09/24/2018                 INR                      1.07                09/24/2018            PTT:  Lab Results       Component                Value               Date                       APTT                     24.3                09/18/2018          (APTT  U/A:  Lab Results       Component                Value               Date                       COLORU                   YELLOW              09/24/2018                 WBCUA                    3                   09/24/2018                 RBCUA                    <1                  09/24/2018                 MUCUS                    RARE                09/24/2018                 TRICHOMONAS              NONE SEEN           09/24/2018                 YEAST                    RARE                09/17/2018                 BACTERIA                 OCCASIONAL          09/24/2018                 CLARITYU                 HAZY                09/24/2018                 SPECGRAV                 1.011               09/24/2018                 LEUKOCYTESUR             TRACE               09/24/2018                 UROBILINOGEN             NORMAL              09/24/2018                 BILIRUBINUR              NEGATIVE            09/24/2018                 BLOODU                   NEGATIVE            09/24/2018            TSH:  Lab Results Component                Value               Date                       TSH                      2.5                 01/24/2014            VITAMIN B12: No components found for: B12  FOLATE:  No results found for: FOLATE  RPR:  No results found for: RPR  YAZ:  No results found for: ANATITER, YAZ  Urine Toxicology:  No components found for: IAMMENTA, IBARBIT, IBENZO, ICOCAINE, IMARTHC, IOPIATES, IPHENCYC     IMPRESSION:    Metabolic encephalopathy    ? ETOH withdrawal    R/o acute CVA    R/o sepsis    PLAN:    CT brain neg    Mri brain    B 12 folate TSH    CPM    Leonela Blakely MD  BOARD CERTIFIED-NEUROLOGY.

## 2018-09-26 ENCOUNTER — APPOINTMENT (OUTPATIENT)
Dept: ULTRASOUND IMAGING | Age: 63
DRG: 689 | End: 2018-09-26
Payer: MEDICARE

## 2018-09-26 ENCOUNTER — APPOINTMENT (OUTPATIENT)
Dept: MRI IMAGING | Age: 63
DRG: 689 | End: 2018-09-26
Payer: MEDICARE

## 2018-09-26 LAB
ANION GAP SERPL CALCULATED.3IONS-SCNC: 14 MMOL/L (ref 4–16)
ANISOCYTOSIS: ABNORMAL
BANDED NEUTROPHILS ABSOLUTE COUNT: 2.36 K/CU MM
BANDED NEUTROPHILS RELATIVE PERCENT: 10 % (ref 5–11)
BUN BLDV-MCNC: 10 MG/DL (ref 6–23)
CALCIUM SERPL-MCNC: 8 MG/DL (ref 8.3–10.6)
CHLORIDE BLD-SCNC: 104 MMOL/L (ref 99–110)
CO2: 21 MMOL/L (ref 21–32)
CREAT SERPL-MCNC: 0.9 MG/DL (ref 0.6–1.1)
DIFFERENTIAL TYPE: ABNORMAL
EOSINOPHILS ABSOLUTE: 0.5 K/CU MM
EOSINOPHILS RELATIVE PERCENT: 2 % (ref 0–3)
FOLATE: 8 NG/ML (ref 3.1–17.5)
GFR AFRICAN AMERICAN: >60 ML/MIN/1.73M2
GFR NON-AFRICAN AMERICAN: >60 ML/MIN/1.73M2
GLUCOSE BLD-MCNC: 101 MG/DL (ref 70–99)
HCT VFR BLD CALC: 28.5 % (ref 37–47)
HEMOGLOBIN: 9.6 GM/DL (ref 12.5–16)
LYMPHOCYTES ABSOLUTE: 0.5 K/CU MM
LYMPHOCYTES RELATIVE PERCENT: 2 % (ref 24–44)
MACROCYTES: ABNORMAL
MCH RBC QN AUTO: 32.8 PG (ref 27–31)
MCHC RBC AUTO-ENTMCNC: 33.7 % (ref 32–36)
MCV RBC AUTO: 97.3 FL (ref 78–100)
MICROCYTES: ABNORMAL
MONOCYTES ABSOLUTE: 0.2 K/CU MM
MONOCYTES RELATIVE PERCENT: 1 % (ref 0–4)
PDW BLD-RTO: 14.6 % (ref 11.7–14.9)
PLATELET # BLD: 259 K/CU MM (ref 140–440)
PLT MORPHOLOGY: ABNORMAL
PMV BLD AUTO: 10.4 FL (ref 7.5–11.1)
POLYCHROMASIA: ABNORMAL
POTASSIUM SERPL-SCNC: 3.5 MMOL/L (ref 3.5–5.1)
RBC # BLD: 2.93 M/CU MM (ref 4.2–5.4)
SEGMENTED NEUTROPHILS ABSOLUTE COUNT: 20 K/CU MM
SEGMENTED NEUTROPHILS RELATIVE PERCENT: 85 % (ref 36–66)
SODIUM BLD-SCNC: 139 MMOL/L (ref 135–145)
TARGET CELLS: ABNORMAL
VITAMIN B-12: 379.4 PG/ML (ref 211–911)
WBC # BLD: 23.6 K/CU MM (ref 4–10.5)

## 2018-09-26 PROCEDURE — 6360000002 HC RX W HCPCS: Performed by: NURSE PRACTITIONER

## 2018-09-26 PROCEDURE — 6360000002 HC RX W HCPCS: Performed by: INTERNAL MEDICINE

## 2018-09-26 PROCEDURE — 6360000002 HC RX W HCPCS: Performed by: HOSPITALIST

## 2018-09-26 PROCEDURE — 2580000003 HC RX 258: Performed by: INTERNAL MEDICINE

## 2018-09-26 PROCEDURE — 94150 VITAL CAPACITY TEST: CPT

## 2018-09-26 PROCEDURE — 36415 COLL VENOUS BLD VENIPUNCTURE: CPT

## 2018-09-26 PROCEDURE — 1200000000 HC SEMI PRIVATE

## 2018-09-26 PROCEDURE — 94761 N-INVAS EAR/PLS OXIMETRY MLT: CPT

## 2018-09-26 PROCEDURE — 85027 COMPLETE CBC AUTOMATED: CPT

## 2018-09-26 PROCEDURE — 2580000003 HC RX 258: Performed by: NURSE PRACTITIONER

## 2018-09-26 PROCEDURE — 80048 BASIC METABOLIC PNL TOTAL CA: CPT

## 2018-09-26 PROCEDURE — 70551 MRI BRAIN STEM W/O DYE: CPT

## 2018-09-26 PROCEDURE — 6370000000 HC RX 637 (ALT 250 FOR IP): Performed by: INTERNAL MEDICINE

## 2018-09-26 PROCEDURE — 76705 ECHO EXAM OF ABDOMEN: CPT

## 2018-09-26 PROCEDURE — 93010 ELECTROCARDIOGRAM REPORT: CPT | Performed by: INTERNAL MEDICINE

## 2018-09-26 PROCEDURE — 82607 VITAMIN B-12: CPT

## 2018-09-26 PROCEDURE — 85007 BL SMEAR W/DIFF WBC COUNT: CPT

## 2018-09-26 PROCEDURE — 6370000000 HC RX 637 (ALT 250 FOR IP): Performed by: NURSE PRACTITIONER

## 2018-09-26 PROCEDURE — 82746 ASSAY OF FOLIC ACID SERUM: CPT

## 2018-09-26 RX ORDER — DIPHENHYDRAMINE HYDROCHLORIDE 50 MG/ML
25 INJECTION INTRAMUSCULAR; INTRAVENOUS ONCE
Status: COMPLETED | OUTPATIENT
Start: 2018-09-26 | End: 2018-09-26

## 2018-09-26 RX ORDER — HYDROMORPHONE HCL 110MG/55ML
PATIENT CONTROLLED ANALGESIA SYRINGE INTRAVENOUS
Status: DISPENSED
Start: 2018-09-26 | End: 2018-09-27

## 2018-09-26 RX ORDER — AMOXICILLIN AND CLAVULANATE POTASSIUM 875; 125 MG/1; MG/1
1 TABLET, FILM COATED ORAL EVERY 12 HOURS SCHEDULED
Status: DISCONTINUED | OUTPATIENT
Start: 2018-09-26 | End: 2018-09-26

## 2018-09-26 RX ORDER — LIDOCAINE 4 G/G
1 PATCH TOPICAL DAILY
Status: DISCONTINUED | OUTPATIENT
Start: 2018-09-26 | End: 2018-09-28 | Stop reason: HOSPADM

## 2018-09-26 RX ORDER — DIPHENHYDRAMINE HCL 25 MG
50 TABLET ORAL ONCE
Status: COMPLETED | OUTPATIENT
Start: 2018-09-26 | End: 2018-09-26

## 2018-09-26 RX ORDER — HYDROMORPHONE HCL 110MG/55ML
0.5 PATIENT CONTROLLED ANALGESIA SYRINGE INTRAVENOUS ONCE
Status: COMPLETED | OUTPATIENT
Start: 2018-09-26 | End: 2018-09-26

## 2018-09-26 RX ADMIN — SODIUM CHLORIDE, PRESERVATIVE FREE 10 ML: 5 INJECTION INTRAVENOUS at 08:27

## 2018-09-26 RX ADMIN — DOCUSATE SODIUM 100 MG: 100 CAPSULE, LIQUID FILLED ORAL at 08:27

## 2018-09-26 RX ADMIN — SODIUM CHLORIDE: 9 INJECTION, SOLUTION INTRAVENOUS at 21:00

## 2018-09-26 RX ADMIN — SODIUM CHLORIDE: 9 INJECTION, SOLUTION INTRAVENOUS at 06:26

## 2018-09-26 RX ADMIN — PANTOPRAZOLE SODIUM 40 MG: 40 TABLET, DELAYED RELEASE ORAL at 06:19

## 2018-09-26 RX ADMIN — Medication 100 MG: at 08:26

## 2018-09-26 RX ADMIN — ACETAMINOPHEN 650 MG: 325 TABLET ORAL at 08:27

## 2018-09-26 RX ADMIN — DIPHENHYDRAMINE HCL 50 MG: 25 TABLET ORAL at 12:26

## 2018-09-26 RX ADMIN — SODIUM CHLORIDE 75 ML/HR: 9 INJECTION, SOLUTION INTRAVENOUS at 08:26

## 2018-09-26 RX ADMIN — DIPHENHYDRAMINE HYDROCHLORIDE 25 MG: 50 INJECTION, SOLUTION INTRAMUSCULAR; INTRAVENOUS at 21:00

## 2018-09-26 RX ADMIN — MEROPENEM 1 G: 1 INJECTION, POWDER, FOR SOLUTION INTRAVENOUS at 13:39

## 2018-09-26 RX ADMIN — ENOXAPARIN SODIUM 40 MG: 40 INJECTION SUBCUTANEOUS at 08:27

## 2018-09-26 RX ADMIN — HYDROMORPHONE HYDROCHLORIDE 0.5 MG: 2 INJECTION INTRAMUSCULAR; INTRAVENOUS; SUBCUTANEOUS at 14:10

## 2018-09-26 RX ADMIN — THERA TABS 1 TABLET: TAB at 08:27

## 2018-09-26 RX ADMIN — AMOXICILLIN AND CLAVULANATE POTASSIUM 1 TABLET: 875; 125 TABLET, FILM COATED ORAL at 11:30

## 2018-09-26 RX ADMIN — LIDOCAINE 1 PATCH: 246 PATCH TOPICAL at 12:26

## 2018-09-26 RX ADMIN — FOLIC ACID 1 MG: 1 TABLET ORAL at 08:27

## 2018-09-26 ASSESSMENT — PAIN SCALES - GENERAL
PAINLEVEL_OUTOF10: 4
PAINLEVEL_OUTOF10: 5
PAINLEVEL_OUTOF10: 9
PAINLEVEL_OUTOF10: 2
PAINLEVEL_OUTOF10: 9
PAINLEVEL_OUTOF10: 8

## 2018-09-26 ASSESSMENT — PAIN DESCRIPTION - LOCATION
LOCATION: FOOT
LOCATION: SHOULDER
LOCATION: BACK
LOCATION: BACK;SHOULDER

## 2018-09-26 ASSESSMENT — PAIN DESCRIPTION - PAIN TYPE
TYPE: ACUTE PAIN
TYPE: ACUTE PAIN

## 2018-09-26 ASSESSMENT — PAIN DESCRIPTION - ORIENTATION
ORIENTATION: RIGHT;LEFT
ORIENTATION: LEFT

## 2018-09-26 NOTE — PROGRESS NOTES
Hospitalist Progress Note      Name:  Zaire Knigth /Age/Sex: 1955  (59 y.o. female)   MRN & CSN:  [id number] & [id number] Admission Date/Time: 2018 10:25 AM   Location:  9153/2308-J PCP: Leidy Mansfield MD         Hospital Day: 3    Assessment and Plan:   Zaire Knight is a 59 y.o.  female  who presents with Confusion. 1. Acute encephalopathy: Could be from incompletely treated urinary tract infection, worsening dementia: Afebrile. WBC 23.6. Still with some confusion. CT head with no acute process. MRI with no acute intracranial process. Urine culture with ESBL resistant to quinolones. She was discharged on Levaquin. Start Unasyn, started on Augmentin this morning but has allergic reaction. Given Meropenem today. We'll switch to Bactrim tomorrow if kidney function is better. Neurology on consult. 2. Alcohol use disorder: Started on multivitamins, folic acid, thiamine. 3. Acute kidney injury: Creatinine 1.5. Start IV fluid. 4. Has elevated ammonia on it last admission: For a liver ultrasound. 5. Hypertension: Will monitor. Diet DIET GENERAL;   DVT Prophylaxis [x] Lovenox, []  Heparin, [] SCDs, [] Ambulation   GI Prophylaxis [x] PPI,  [] H2 Blocker,  [] Carafate,  [] Diet/Tube Feeds   Code Status Full Code   MDM [] Low, [x] Moderate,[]  High     History of Present Illness:     Chief Complaint:   Zaire Knight is a 59 y.o.  female  who presents with Confusion    Seen and examined today. Awake but less confused. Denied urinary symptoms. Denied headache, fever, cough or abdominal pain. Ten point ROS reviewed negative, unless as noted above    Objective:        Intake/Output Summary (Last 24 hours) at 18 1125  Last data filed at 18 1441   Gross per 24 hour   Intake              240 ml   Output              200 ml   Net               40 ml      Vitals:   Vitals:    18 0622   BP:    Pulse: 118   Resp: 18   Temp: 99 °F (37.2 °C)   SpO2:      Physical

## 2018-09-26 NOTE — CARE COORDINATION
CM into see pt to initiate a safe discharge plan. Cm into see, introduced self and explained role of CM. Pt is sitting up in bed. Pt is kind, alert and oriented but has difficulty with memory. Pt lives alone in one floor plan home. Pt is in process of moving from her apartment to a home. Pt has three daughters that are local and pt describes as very attentive and supportive. Pt has PCP Dr Mimi Hoyos. Pt has insurance and prescriptions are managed. Pt shared that she uses no DME. She shared that she is indepen for her ADL's. Pt shared that she has a car and able to drive. CM asked about ETOH hx. Pt shared that she has had tx in the past and declines need for any assistance at this time. Pt said that it was her dtr's birthday and she had a few drinks on Sunday but usually she is doing ok. At discharge pt plans to return home with her dtr providing transportation. CM updated white board and encouraged to call for any needs or concern. CM is available if any needs arise.    [address]

## 2018-09-27 LAB
ANION GAP SERPL CALCULATED.3IONS-SCNC: 14 MMOL/L (ref 4–16)
BANDED NEUTROPHILS ABSOLUTE COUNT: 0.67 K/CU MM
BANDED NEUTROPHILS RELATIVE PERCENT: 3 % (ref 5–11)
BUN BLDV-MCNC: 7 MG/DL (ref 6–23)
CALCIUM SERPL-MCNC: 7.9 MG/DL (ref 8.3–10.6)
CHLORIDE BLD-SCNC: 107 MMOL/L (ref 99–110)
CO2: 21 MMOL/L (ref 21–32)
CREAT SERPL-MCNC: 0.8 MG/DL (ref 0.6–1.1)
DIFFERENTIAL TYPE: ABNORMAL
EOSINOPHILS ABSOLUTE: 0.9 K/CU MM
EOSINOPHILS RELATIVE PERCENT: 4 % (ref 0–3)
GFR AFRICAN AMERICAN: >60 ML/MIN/1.73M2
GFR NON-AFRICAN AMERICAN: >60 ML/MIN/1.73M2
GLUCOSE BLD-MCNC: 87 MG/DL (ref 70–99)
HCT VFR BLD CALC: 27.4 % (ref 37–47)
HEMOGLOBIN: 8.8 GM/DL (ref 12.5–16)
LYMPHOCYTES ABSOLUTE: 2.5 K/CU MM
LYMPHOCYTES RELATIVE PERCENT: 11 % (ref 24–44)
MCH RBC QN AUTO: 31.9 PG (ref 27–31)
MCHC RBC AUTO-ENTMCNC: 32.1 % (ref 32–36)
MCV RBC AUTO: 99.3 FL (ref 78–100)
MONOCYTES ABSOLUTE: 1.1 K/CU MM
MONOCYTES RELATIVE PERCENT: 5 % (ref 0–4)
PDW BLD-RTO: 14.9 % (ref 11.7–14.9)
PLATELET # BLD: 226 K/CU MM (ref 140–440)
PMV BLD AUTO: 10.7 FL (ref 7.5–11.1)
POTASSIUM SERPL-SCNC: 3.4 MMOL/L (ref 3.5–5.1)
RBC # BLD: 2.76 M/CU MM (ref 4.2–5.4)
SEGMENTED NEUTROPHILS ABSOLUTE COUNT: 17.1 K/CU MM
SEGMENTED NEUTROPHILS RELATIVE PERCENT: 77 % (ref 36–66)
SODIUM BLD-SCNC: 142 MMOL/L (ref 135–145)
WBC # BLD: 22.3 K/CU MM (ref 4–10.5)

## 2018-09-27 PROCEDURE — 6370000000 HC RX 637 (ALT 250 FOR IP): Performed by: INTERNAL MEDICINE

## 2018-09-27 PROCEDURE — 94150 VITAL CAPACITY TEST: CPT

## 2018-09-27 PROCEDURE — G8980 MOBILITY D/C STATUS: HCPCS

## 2018-09-27 PROCEDURE — 80048 BASIC METABOLIC PNL TOTAL CA: CPT

## 2018-09-27 PROCEDURE — 85027 COMPLETE CBC AUTOMATED: CPT

## 2018-09-27 PROCEDURE — 2580000003 HC RX 258: Performed by: NURSE PRACTITIONER

## 2018-09-27 PROCEDURE — 1200000000 HC SEMI PRIVATE

## 2018-09-27 PROCEDURE — 85007 BL SMEAR W/DIFF WBC COUNT: CPT

## 2018-09-27 PROCEDURE — 2580000003 HC RX 258: Performed by: INTERNAL MEDICINE

## 2018-09-27 PROCEDURE — 6370000000 HC RX 637 (ALT 250 FOR IP): Performed by: NURSE PRACTITIONER

## 2018-09-27 PROCEDURE — 97162 PT EVAL MOD COMPLEX 30 MIN: CPT

## 2018-09-27 PROCEDURE — 97116 GAIT TRAINING THERAPY: CPT

## 2018-09-27 PROCEDURE — 6360000002 HC RX W HCPCS: Performed by: INTERNAL MEDICINE

## 2018-09-27 PROCEDURE — 36415 COLL VENOUS BLD VENIPUNCTURE: CPT

## 2018-09-27 PROCEDURE — G8978 MOBILITY CURRENT STATUS: HCPCS

## 2018-09-27 PROCEDURE — G8979 MOBILITY GOAL STATUS: HCPCS

## 2018-09-27 PROCEDURE — 6360000002 HC RX W HCPCS: Performed by: NURSE PRACTITIONER

## 2018-09-27 RX ORDER — TRAZODONE HYDROCHLORIDE 50 MG/1
50 TABLET ORAL NIGHTLY PRN
Status: DISCONTINUED | OUTPATIENT
Start: 2018-09-27 | End: 2018-09-28 | Stop reason: HOSPADM

## 2018-09-27 RX ORDER — DIPHENHYDRAMINE HCL 25 MG
25 TABLET ORAL 2 TIMES DAILY
Status: DISCONTINUED | OUTPATIENT
Start: 2018-09-27 | End: 2018-09-28 | Stop reason: HOSPADM

## 2018-09-27 RX ORDER — SULFAMETHOXAZOLE AND TRIMETHOPRIM 800; 160 MG/1; MG/1
1 TABLET ORAL EVERY 12 HOURS SCHEDULED
Status: DISCONTINUED | OUTPATIENT
Start: 2018-09-27 | End: 2018-09-28 | Stop reason: HOSPADM

## 2018-09-27 RX ADMIN — DIPHENHYDRAMINE HCL 25 MG: 25 TABLET ORAL at 13:00

## 2018-09-27 RX ADMIN — POTASSIUM CHLORIDE 40 MEQ: 20 TABLET, EXTENDED RELEASE ORAL at 05:50

## 2018-09-27 RX ADMIN — SODIUM CHLORIDE, PRESERVATIVE FREE 10 ML: 5 INJECTION INTRAVENOUS at 10:02

## 2018-09-27 RX ADMIN — TRAZODONE HYDROCHLORIDE 50 MG: 50 TABLET ORAL at 20:58

## 2018-09-27 RX ADMIN — ENOXAPARIN SODIUM 40 MG: 40 INJECTION SUBCUTANEOUS at 10:03

## 2018-09-27 RX ADMIN — Medication 100 MG: at 10:02

## 2018-09-27 RX ADMIN — FOLIC ACID 1 MG: 1 TABLET ORAL at 10:03

## 2018-09-27 RX ADMIN — ACETAMINOPHEN 650 MG: 325 TABLET ORAL at 10:01

## 2018-09-27 RX ADMIN — MEROPENEM 1 G: 1 INJECTION, POWDER, FOR SOLUTION INTRAVENOUS at 00:53

## 2018-09-27 RX ADMIN — LIDOCAINE 1 PATCH: 246 PATCH TOPICAL at 10:03

## 2018-09-27 RX ADMIN — SULFAMETHOXAZOLE AND TRIMETHOPRIM 1 TABLET: 800; 160 TABLET ORAL at 20:58

## 2018-09-27 RX ADMIN — SODIUM CHLORIDE: 9 INJECTION, SOLUTION INTRAVENOUS at 10:02

## 2018-09-27 RX ADMIN — DIPHENHYDRAMINE HCL 25 MG: 25 TABLET ORAL at 20:57

## 2018-09-27 RX ADMIN — THERA TABS 1 TABLET: TAB at 10:02

## 2018-09-27 RX ADMIN — PANTOPRAZOLE SODIUM 40 MG: 40 TABLET, DELAYED RELEASE ORAL at 05:50

## 2018-09-27 RX ADMIN — DOCUSATE SODIUM 100 MG: 100 CAPSULE, LIQUID FILLED ORAL at 20:57

## 2018-09-27 ASSESSMENT — PAIN SCALES - GENERAL
PAINLEVEL_OUTOF10: 4
PAINLEVEL_OUTOF10: 4

## 2018-09-27 NOTE — PROGRESS NOTES
NEUROLOGY NOTE  DR. Becca Nice MD.  -------------------------------------------------  Subjective:    Pt states she is at work -discussed with pt that she is admitted to the hospital and pt understood    Doing better. Denies any new symptoms. Denies headache nausea vomiting dizziness    Denies numbness weakness extremities    Denies blurring of vision double vision    Objective:    BP (!) 113/55   Pulse 94   Temp 97.9 °F (36.6 °C) (Oral)   Resp 20   Ht 5' (1.524 m)   Wt 185 lb 1.6 oz (84 kg)   LMP 02/07/2012   SpO2 98%   BMI 36.15 kg/m²   HEENT nl      Neuro exam    Alert Oriented  X 3  Follow simple commands  Moderate dementia vs encephalopathy vs both  EOMI Pupils 3 mm ney  5/5 all 4 extremities      RADIOLOGY  -----------------    Xr Chest Standard (2 Vw)    Result Date: 9/24/2018  EXAMINATION: TWO VIEWS OF THE CHEST 9/24/2018 11:01 am COMPARISON: Prior exams dating back to at least 2012 HISTORY: [facility]: Chest pain TECHNOLOGIST PROVIDED HISTORY: Reason for exam:->Chest pain Ordering Physician Provided Reason for Exam: increased confusion Acuity: Chronic Type of Exam: Ongoing FINDINGS: The lungs are without acute focal process. There is no effusion or pneumothorax. The cardiomediastinal silhouette is without acute process. The osseous structures are without acute process. No acute process. Ct Head Wo Contrast    Result Date: 9/24/2018  EXAMINATION: CT OF THE HEAD WITHOUT CONTRAST  9/24/2018 12:10 pm TECHNIQUE: CT of the head was performed without the administration of intravenous contrast. Dose modulation, iterative reconstruction, and/or weight based adjustment of the mA/kV was utilized to reduce the radiation dose to as low as reasonably achievable. COMPARISON: CT head September 17, 2018 HISTORY: ORDERING SYSTEM PROVIDED HISTORY: altered mental status TECHNOLOGIST PROVIDED HISTORY: Has a \"code stroke\" or \"stroke alert\" been called? ->No Ordering Physician Provided Reason for Exam: AMS Acuity: Acute Type of Exam: Initial Additional signs and symptoms: increased confusion Relevant Medical/Surgical History: no hx FINDINGS: BRAIN/VENTRICLES: There is mild parenchymal volume loss. There is periventricular white matter low attenuation, likely related to mild chronic microvascular disease. There is no acute intracranial hemorrhage, mass effect or midline shift. No abnormal extra-axial fluid collection. The gray-white differentiation is maintained without evidence of an acute infarct. There is no evidence of hydrocephalus. ORBITS: The visualized portion of the orbits demonstrate no acute abnormality. SINUSES: The visualized paranasal sinuses and mastoid air cells demonstrate no acute abnormality. SOFT TISSUES/SKULL:  No acute abnormality of the visualized skull or soft tissues. No acute intracranial abnormality. Mild parenchymal volume loss. Mild chronic microvascular disease. Ct Head Wo Contrast    Result Date: 9/17/2018  EXAMINATION: CT OF THE HEAD WITHOUT CONTRAST  9/17/2018 6:26 pm TECHNIQUE: CT of the head was performed without the administration of intravenous contrast. Dose modulation, iterative reconstruction, and/or weight based adjustment of the mA/kV was utilized to reduce the radiation dose to as low as reasonably achievable. COMPARISON: CT head November 21, 2006 HISTORY: ORDERING SYSTEM PROVIDED HISTORY: CONFUSION/DELIRIUM, ALTERED LOC, UNEXPLAINED TECHNOLOGIST PROVIDED HISTORY: Has a \"code stroke\" or \"stroke alert\" been called? ->No Ordering Physician Provided Reason for Exam: Confusion/delirium, altered loc, unexplained Acuity: Acute Type of Encounter: Initial Additional signs and symptoms: no Relevant Medical/Surgical History: none FINDINGS: BRAIN/VENTRICLES: There is no acute intracranial hemorrhage, mass effect or midline shift. No abnormal extra-axial fluid collection.   The gray-white differentiation is maintained without evidence of an acute

## 2018-09-27 NOTE — PROGRESS NOTES
panel    Collection Time: 09/26/18  5:10 AM   Result Value Ref Range    Sodium 139 135 - 145 MMOL/L    Potassium 3.5 3.5 - 5.1 MMOL/L    Chloride 104 99 - 110 mMol/L    CO2 21 21 - 32 MMOL/L    Anion Gap 14 4 - 16    BUN 10 6 - 23 MG/DL    CREATININE 0.9 0.6 - 1.1 MG/DL    Glucose 101 (H) 70 - 99 MG/DL    Calcium 8.0 (L) 8.3 - 10.6 MG/DL    GFR Non-African American >60 >60 mL/min/1.73m2    GFR African American >60 >60 mL/min/1.73m2         Medical problems    Patient Active Problem List:     Tobacco use disorder     Dysphagia     Fatigue     Anemia     Complicated UTI (urinary tract infection)     Pneumonia     Encephalopathy acute      ASSESSMENT:  ---------------------    Metabolic encephalopathy     ? ETOH withdrawal     R/o acute CVA     R/o sepsis     PLAN:     CT brain neg     Mri brain small vessel disease     B 12 folate TSH nl     CPM        Electronically signed by Marion Calderon MD on 9/26/2018 at 11:41 PM

## 2018-09-27 NOTE — PROGRESS NOTES
0451   WBC  13.9*  23.6*  22.3*   HGB  8.3*  9.6*  8.8*   HCT  25.4*  28.5*  27.4*   PLT  282  259  226      Recent Labs      09/25/18   0348  09/26/18   0510  09/27/18   0451   NA  142  139  142   K  3.7  3.5  3.4*   CL  103  104  107   CO2  23  21  21   PHOS  4.0   --    --    BUN  16  10  7   CREATININE  1.5*  0.9  0.8     Recent Labs      09/25/18   0348   AST  56*   ALT  27   BILITOT  0.4   ALKPHOS  99     No results for input(s): INR in the last 72 hours. Recent Labs      09/24/18   1745  09/25/18   0348   TROPONINT  <0.010  <0.010      Imaging reviewed.     Electronically signed by Herlinda Mooney MD on 9/27/2018 at 1:33 PM

## 2018-09-28 ENCOUNTER — TELEPHONE (OUTPATIENT)
Dept: FAMILY MEDICINE CLINIC | Age: 63
End: 2018-09-28

## 2018-09-28 VITALS
HEIGHT: 60 IN | RESPIRATION RATE: 16 BRPM | WEIGHT: 185 LBS | OXYGEN SATURATION: 93 % | SYSTOLIC BLOOD PRESSURE: 126 MMHG | DIASTOLIC BLOOD PRESSURE: 60 MMHG | BODY MASS INDEX: 36.32 KG/M2 | HEART RATE: 105 BPM | TEMPERATURE: 98.6 F

## 2018-09-28 LAB
ANION GAP SERPL CALCULATED.3IONS-SCNC: 14 MMOL/L (ref 4–16)
ANISOCYTOSIS: ABNORMAL
BANDED NEUTROPHILS ABSOLUTE COUNT: 2.03 K/CU MM
BANDED NEUTROPHILS RELATIVE PERCENT: 12 % (ref 5–11)
BUN BLDV-MCNC: 4 MG/DL (ref 6–23)
CALCIUM SERPL-MCNC: 8.1 MG/DL (ref 8.3–10.6)
CHLORIDE BLD-SCNC: 107 MMOL/L (ref 99–110)
CO2: 20 MMOL/L (ref 21–32)
CREAT SERPL-MCNC: 0.6 MG/DL (ref 0.6–1.1)
DIFFERENTIAL TYPE: ABNORMAL
EOSINOPHILS ABSOLUTE: 0.7 K/CU MM
EOSINOPHILS RELATIVE PERCENT: 4 % (ref 0–3)
GFR AFRICAN AMERICAN: >60 ML/MIN/1.73M2
GFR NON-AFRICAN AMERICAN: >60 ML/MIN/1.73M2
GLUCOSE BLD-MCNC: 83 MG/DL (ref 70–99)
HCT VFR BLD CALC: 27.3 % (ref 37–47)
HEMOGLOBIN: 9.2 GM/DL (ref 12.5–16)
LYMPHOCYTES ABSOLUTE: 2.9 K/CU MM
LYMPHOCYTES RELATIVE PERCENT: 17 % (ref 24–44)
MACROCYTES: ABNORMAL
MCH RBC QN AUTO: 32.7 PG (ref 27–31)
MCHC RBC AUTO-ENTMCNC: 33.7 % (ref 32–36)
MCV RBC AUTO: 97.2 FL (ref 78–100)
MONOCYTES ABSOLUTE: 0.8 K/CU MM
MONOCYTES RELATIVE PERCENT: 5 % (ref 0–4)
PDW BLD-RTO: 14.8 % (ref 11.7–14.9)
PLATELET # BLD: 228 K/CU MM (ref 140–440)
PLT MORPHOLOGY: ABNORMAL
PMV BLD AUTO: 10.6 FL (ref 7.5–11.1)
POLYCHROMASIA: ABNORMAL
POTASSIUM SERPL-SCNC: 3.1 MMOL/L (ref 3.5–5.1)
RBC # BLD: 2.81 M/CU MM (ref 4.2–5.4)
SEGMENTED NEUTROPHILS ABSOLUTE COUNT: 10.5 K/CU MM
SEGMENTED NEUTROPHILS RELATIVE PERCENT: 62 % (ref 36–66)
SODIUM BLD-SCNC: 141 MMOL/L (ref 135–145)
TARGET CELLS: ABNORMAL
WBC # BLD: 16.9 K/CU MM (ref 4–10.5)
WBC # BLD: ABNORMAL 10*3/UL

## 2018-09-28 PROCEDURE — 94761 N-INVAS EAR/PLS OXIMETRY MLT: CPT

## 2018-09-28 PROCEDURE — 97165 OT EVAL LOW COMPLEX 30 MIN: CPT

## 2018-09-28 PROCEDURE — 85007 BL SMEAR W/DIFF WBC COUNT: CPT

## 2018-09-28 PROCEDURE — G8988 SELF CARE GOAL STATUS: HCPCS

## 2018-09-28 PROCEDURE — 6370000000 HC RX 637 (ALT 250 FOR IP): Performed by: INTERNAL MEDICINE

## 2018-09-28 PROCEDURE — G8987 SELF CARE CURRENT STATUS: HCPCS

## 2018-09-28 PROCEDURE — 2580000003 HC RX 258: Performed by: NURSE PRACTITIONER

## 2018-09-28 PROCEDURE — 6370000000 HC RX 637 (ALT 250 FOR IP): Performed by: NURSE PRACTITIONER

## 2018-09-28 PROCEDURE — 36415 COLL VENOUS BLD VENIPUNCTURE: CPT

## 2018-09-28 PROCEDURE — 85027 COMPLETE CBC AUTOMATED: CPT

## 2018-09-28 PROCEDURE — 80048 BASIC METABOLIC PNL TOTAL CA: CPT

## 2018-09-28 PROCEDURE — 6360000002 HC RX W HCPCS: Performed by: NURSE PRACTITIONER

## 2018-09-28 PROCEDURE — G8989 SELF CARE D/C STATUS: HCPCS

## 2018-09-28 PROCEDURE — 94150 VITAL CAPACITY TEST: CPT

## 2018-09-28 RX ORDER — DIPHENHYDRAMINE HYDROCHLORIDE, ZINC ACETATE 2; .1 G/100G; G/100G
CREAM TOPICAL
Qty: 1 TUBE | Refills: 0 | Status: SHIPPED | OUTPATIENT
Start: 2018-09-28 | End: 2019-04-09

## 2018-09-28 RX ORDER — DIPHENHYDRAMINE HCL 25 MG
25 TABLET ORAL EVERY 8 HOURS PRN
Qty: 60 TABLET | Refills: 0 | Status: SHIPPED | OUTPATIENT
Start: 2018-09-28 | End: 2018-10-28

## 2018-09-28 RX ORDER — SULFAMETHOXAZOLE AND TRIMETHOPRIM 800; 160 MG/1; MG/1
1 TABLET ORAL EVERY 12 HOURS SCHEDULED
Qty: 10 TABLET | Refills: 0 | Status: SHIPPED | OUTPATIENT
Start: 2018-09-29 | End: 2018-10-04

## 2018-09-28 RX ORDER — FOLIC ACID 1 MG/1
1 TABLET ORAL DAILY
Qty: 30 TABLET | Refills: 3 | Status: SHIPPED | OUTPATIENT
Start: 2018-09-29 | End: 2019-04-09

## 2018-09-28 RX ORDER — MULTIVITAMIN WITH FOLIC ACID 400 MCG
1 TABLET ORAL DAILY
Qty: 30 TABLET | Refills: 0 | Status: SHIPPED | OUTPATIENT
Start: 2018-09-29 | End: 2019-04-09

## 2018-09-28 RX ORDER — LIDOCAINE 4 G/G
1 PATCH TOPICAL DAILY
Qty: 7 PATCH | Refills: 0 | Status: SHIPPED | OUTPATIENT
Start: 2018-09-29 | End: 2019-04-09

## 2018-09-28 RX ORDER — PSEUDOEPHEDRINE HCL 30 MG
100 TABLET ORAL 2 TIMES DAILY PRN
Qty: 10 CAPSULE | Refills: 0 | Status: SHIPPED | OUTPATIENT
Start: 2018-09-28 | End: 2019-04-08

## 2018-09-28 RX ORDER — LANOLIN ALCOHOL/MO/W.PET/CERES
100 CREAM (GRAM) TOPICAL DAILY
Qty: 30 TABLET | Refills: 3 | Status: SHIPPED | OUTPATIENT
Start: 2018-09-29

## 2018-09-28 RX ORDER — DIPHENHYDRAMINE HYDROCHLORIDE, ZINC ACETATE 2; .1 G/100G; G/100G
CREAM TOPICAL 3 TIMES DAILY PRN
Status: DISCONTINUED | OUTPATIENT
Start: 2018-09-28 | End: 2018-09-28 | Stop reason: HOSPADM

## 2018-09-28 RX ORDER — TRAZODONE HYDROCHLORIDE 50 MG/1
50 TABLET ORAL NIGHTLY PRN
Qty: 7 TABLET | Refills: 0 | Status: SHIPPED | OUTPATIENT
Start: 2018-09-28 | End: 2018-10-04 | Stop reason: SDUPTHER

## 2018-09-28 RX ADMIN — DIPHENHYDRAMINE HCL 25 MG: 25 TABLET ORAL at 12:19

## 2018-09-28 RX ADMIN — SODIUM CHLORIDE: 9 INJECTION, SOLUTION INTRAVENOUS at 01:57

## 2018-09-28 RX ADMIN — SODIUM CHLORIDE, PRESERVATIVE FREE 10 ML: 5 INJECTION INTRAVENOUS at 10:04

## 2018-09-28 RX ADMIN — SULFAMETHOXAZOLE AND TRIMETHOPRIM 1 TABLET: 800; 160 TABLET ORAL at 10:04

## 2018-09-28 RX ADMIN — LIDOCAINE 1 PATCH: 246 PATCH TOPICAL at 10:04

## 2018-09-28 RX ADMIN — PANTOPRAZOLE SODIUM 40 MG: 40 TABLET, DELAYED RELEASE ORAL at 06:02

## 2018-09-28 RX ADMIN — THERA TABS 1 TABLET: TAB at 10:04

## 2018-09-28 RX ADMIN — ENOXAPARIN SODIUM 40 MG: 40 INJECTION SUBCUTANEOUS at 10:04

## 2018-09-28 RX ADMIN — Medication 100 MG: at 10:03

## 2018-09-28 RX ADMIN — FOLIC ACID 1 MG: 1 TABLET ORAL at 10:04

## 2018-09-28 NOTE — DISCHARGE SUMMARY
Discharge Summary    Name:  Leah Antunez /Age/Sex: 1955  (59 y.o. female)   MRN & CSN:  [id number] & [id number] Admission Date/Time: 2018 10:25 AM   Attending:  Nena Farfan MD Discharging Physician: Jazmin Wills MD     Hospital Course: Leah Antunez is a 59 y.o.  female  who presents with Acute encephalopathy. She was recently admitted to the hospital due to acute encephalopathy from urinary tract infection. She was discharged on Levaquin. Urine culture however showed resistance to quinolones. She came back to the hospital because of the same. She denied abdominal pain, dysuria or urinary frequency. CT scan of the head did not show acute process. Alcohol level was negative. Urinalysis did not show pyuria or bacteriuria. Because of incompletely treated urinary tract infection, she was started on antibiotic to which ESBL was sensitive to. Neurology was consulted. MRI did not show acute process. She developed acute kidney injury which responded to IV fluids. Liver ultrasound was done to look for liver cirrhosis because of elevated ammonia on the last admission. It showed fatty liver. She has episodes of confusion and agitation at night. This could be from delirium or worsening dementia. She was discharged stable. 1. Acute encephalopathy: Could be from incompletely treated urinary tract infection, worsening dementia: Afebrile. WBC 16.  Still with some confusion. CT head with no acute process. MRI with no acute intracranial process. Urine culture with ESBL resistant to quinolones. She was discharged on Levaquin. Start Unasyn, started on Augmentin this morning but has allergic reaction. Given Meropenem, later switched to Bactrim. [facility] on consult. 2. Alcohol use disorder: Started on multivitamins, folic acid, thiamine. 3. Acute kidney injury: Creatinine 0.6. Start IV fluid.   4. Has elevated ammonia on it last admission: liver ultrasound with kg)   LMP 02/07/2012   SpO2 99%   BMI 36.13 kg/m²            PHYSICAL EXAM   GEN Awake female, sitting upright in bed in no apparent distress. Appears given age. EYES Pupils are equally round. No scleral erythema, discharge, or conjunctivitis. HENT Mucous membranes are moist. Oral pharynx without exudates, no evidence of thrush. NECK Supple, no apparent thyromegaly or masses. RESP Clear to auscultation, no wheezes, rales or rhonchi. Symmetric chest movement while on room air. CARDIO/VASC S1/S2 auscultated. Regular rate without appreciable murmurs, rubs, or gallops. No JVD or carotid bruits. Peripheral pulses equal bilaterally and palpable. No peripheral edema. GI Abdomen is soft without significant tenderness, masses, or guarding. Bowel sounds are normoactive. Rectal exam deferred. HEME/LYMPH No palpable cervical lymphadenopathy and no hepatosplenomegaly. No petechiae or ecchymoses. MSK No gross joint deformities. SKIN Normal coloration, warm, dry. Erythematous lesion around the mouth and upper chest.  NEURO Cranial nerves appear grossly intact, normal speech, no lateralizing weakness. PSYCH Awake, Not oriented. BMP/CBC  Recent Labs      09/26/18   0510  09/27/18   0451  09/28/18   0500   NA  139  142  141   K  3.5  3.4*  3.1*   CL  104  107  107   CO2  21  21  20*   BUN  10  7  4*   CREATININE  0.9  0.8  0.6   WBC  23.6*  22.3*  16.9*   HCT  28.5*  27.4*  27.3*   PLT  259  226  228       IMAGING:  Xr Chest Standard (2 Vw)    Result Date: 9/24/2018  EXAMINATION: TWO VIEWS OF THE CHEST 9/24/2018 11:01 am COMPARISON: Prior exams dating back to at least 2012 HISTORY: [facility]: Chest pain TECHNOLOGIST PROVIDED HISTORY: Reason for exam:->Chest pain Ordering Physician Provided Reason for Exam: increased confusion Acuity: Chronic Type of Exam: Ongoing FINDINGS: The lungs are without acute focal process. There is no effusion or pneumothorax.  The cardiomediastinal silhouette is dose to as low as reasonably achievable. COMPARISON: CT head November 21, 2006 HISTORY: ORDERING SYSTEM PROVIDED HISTORY: CONFUSION/DELIRIUM, ALTERED LOC, UNEXPLAINED TECHNOLOGIST PROVIDED HISTORY: Has a \"code stroke\" or \"stroke alert\" been called? ->No Ordering Physician Provided Reason for Exam: Confusion/delirium, altered loc, unexplained Acuity: Acute Type of Encounter: Initial Additional signs and symptoms: no Relevant Medical/Surgical History: none FINDINGS: BRAIN/VENTRICLES: There is no acute intracranial hemorrhage, mass effect or midline shift. No abnormal extra-axial fluid collection. The gray-white differentiation is maintained without evidence of an acute infarct. There is no evidence of hydrocephalus. There is mild periventricular and subcortical white matter hypoattenuation most consistent with microvascular ischemic changes. ORBITS: The visualized portion of the orbits demonstrate no acute abnormality. SINUSES: The visualized paranasal sinuses and mastoid air cells demonstrate no acute abnormality. SOFT TISSUES/SKULL:  No acute abnormality of the visualized skull or soft tissues. Findings suggesting remote fractures of the bilateral lamina papyracea. Findings appear unchanged when compared with prior exam from 2006     No acute intracranial abnormality. Us Abdomen Limited    Result Date: 9/27/2018  EXAMINATION: RIGHT UPPER QUADRANT ULTRASOUND 9/26/2018 6:49 am COMPARISON: None HISTORY: ORDERING SYSTEM PROVIDED HISTORY: cirrhosis TECHNOLOGIST PROVIDED HISTORY: Reason for exam:->cirrhosis Ordering Physician Provided Reason for Exam: cirrhosis Acuity: Chronic Type of Exam: Initial FINDINGS: LIVER:  The liver is diffusely hyperechoic consistent with fatty infiltration. There is no evidence for mass or intrahepatic biliary ductal dilatation. BILIARY SYSTEM:  Gallbladder is unremarkable without evidence of pericholecystic fluid, wall thickening or stones. Negative sonographic Hernandez's sign.  Common

## 2018-09-28 NOTE — PROGRESS NOTES
57.54  ADL Inpatient CMS 0-100% Score: 0  ADL Inpatient CMS G-Code Modifier : [facility] AND [facility]    Goals  Short term goals  Time Frame for Short term goals: eval and discharge  Patient Goals   Patient goals : get better       Time In: 1330  Time Out: 1340  Total Treatment Minutes: 0 minutes  Total Treatment Time: 10 minutes    Ravin CABRERA [id number]  1:54 PM,9/28/2018

## 2018-09-29 LAB
CULTURE: NORMAL
CULTURE: NORMAL
Lab: NORMAL
Lab: NORMAL
REPORT STATUS: NORMAL
REPORT STATUS: NORMAL
SPECIMEN: NORMAL
SPECIMEN: NORMAL

## 2018-10-01 LAB
EKG ATRIAL RATE: 113 BPM
EKG DIAGNOSIS: NORMAL
EKG P AXIS: 39 DEGREES
EKG P-R INTERVAL: 138 MS
EKG Q-T INTERVAL: 364 MS
EKG QRS DURATION: 58 MS
EKG QTC CALCULATION (BAZETT): 499 MS
EKG R AXIS: -4 DEGREES
EKG T AXIS: 35 DEGREES
EKG VENTRICULAR RATE: 113 BPM

## 2018-10-04 ENCOUNTER — OFFICE VISIT (OUTPATIENT)
Dept: FAMILY MEDICINE CLINIC | Age: 63
End: 2018-10-04
Payer: MEDICARE

## 2018-10-04 VITALS
DIASTOLIC BLOOD PRESSURE: 70 MMHG | WEIGHT: 179.8 LBS | BODY MASS INDEX: 35.11 KG/M2 | SYSTOLIC BLOOD PRESSURE: 106 MMHG | HEART RATE: 86 BPM | OXYGEN SATURATION: 92 %

## 2018-10-04 DIAGNOSIS — D64.9 ANEMIA, UNSPECIFIED TYPE: ICD-10-CM

## 2018-10-04 DIAGNOSIS — R53.83 FATIGUE, UNSPECIFIED TYPE: ICD-10-CM

## 2018-10-04 DIAGNOSIS — G93.40 ENCEPHALOPATHY ACUTE: Primary | ICD-10-CM

## 2018-10-04 PROCEDURE — 99495 TRANSJ CARE MGMT MOD F2F 14D: CPT | Performed by: FAMILY MEDICINE

## 2018-10-04 PROCEDURE — 1111F DSCHRG MED/CURRENT MED MERGE: CPT | Performed by: FAMILY MEDICINE

## 2018-10-04 RX ORDER — TRAZODONE HYDROCHLORIDE 50 MG/1
50 TABLET ORAL NIGHTLY PRN
Qty: 30 TABLET | Refills: 2 | Status: SHIPPED | OUTPATIENT
Start: 2018-10-04 | End: 2019-02-01 | Stop reason: SDUPTHER

## 2018-10-04 RX ORDER — OMEPRAZOLE 40 MG/1
40 CAPSULE, DELAYED RELEASE ORAL DAILY
Qty: 30 CAPSULE | Refills: 2 | Status: SHIPPED | OUTPATIENT
Start: 2018-10-04

## 2018-10-04 NOTE — PROGRESS NOTES
Post-Discharge Transitional Care Management Services or Hospital Follow Up      Cristina Max   YOB: 1955    Date of Office Visit:  10/4/2018  Date of Hospital Admission: 9/24/18  Date of Hospital Discharge: 9/28/18  Readmission Risk Score(high >=14%. Medium >=10%):Readmission Risk Score: 17    Care management risk score Rising risk (score 2-5) and Complex Care (Scores >=6): 1     Non face to face  following discharge, date last encounter closed (first attempt may have been earlier): 9/28/2018 12:00 PM 9/28/2018 12:00 PM    Call initiated 2 business days of discharge: Yes     Patient admitted on 9/24/2018 for acute encephalopathy which was recurrent. She had been hospitalized earlier in September for her UTI and sent home on Levaquin. Ultimately her infection was resistant to Levaquin and she had several different antibiotics used during this current admission. She also has a history of alcohol issues and fatty liver with elevated ammonia levels. She had episodes of confusion and agitation as well. She is discharged in stable condition with different antibiotic. Presently without any urinary sx. Potassium was still low on 9/28 labs but renal function back to baseline. She did have anemia on these labs with Hgb 9.2. She has history of anemia and is a current smoker. Weight is down from hospital.     Has neuro recheck on 10/24. Worsened anemia, level 9.2 on 9/28. Hypokalemia in hospital.  Ammonia up once but repeat readings were neg twice. Finished bactrim today. Rhematoid arthrtis and leg and feet pain.         Patient Active Problem List   Diagnosis    Tobacco use disorder    Dysphagia    Fatigue    Anemia    Complicated UTI (urinary tract infection)    Pneumonia    Encephalopathy acute       Allergies   Allergen Reactions    Amoxicillin-Pot Clavulanate Rash       Medications listed as ordered at the time of discharge from hospital   [address], [address] [facility] Medication Instructions MARK:    Printed on:10/04/18 2354   Medication Information                      diphenhydrAMINE (BENADRYL) 25 MG tablet  Take 1 tablet by mouth every 8 hours as needed for Itching             diphenhydrAMINE-zinc acetate 2-0.1 % cream  Apply topically 3 times daily as needed. docusate sodium (COLACE, DULCOLAX) 100 MG CAPS  Take 100 mg by mouth 2 times daily as needed for Constipation             folic acid (FOLVITE) 1 MG tablet  Take 1 tablet by mouth daily             hydrochlorothiazide (HYDRODIURIL) 25 MG tablet  Take 1 tablet by mouth daily             Lidocaine 4 % PTCH  Place 1 patch onto the skin daily             Multiple Vitamin (MULTIVITAMIN) tablet  Take 1 tablet by mouth daily             omeprazole (PRILOSEC) 40 MG delayed release capsule  Take 1 capsule by mouth daily             traZODone (DESYREL) 50 MG tablet  Take 1 tablet by mouth nightly as needed for Sleep             vitamin B-1 100 MG tablet  Take 1 tablet by mouth daily                   Medications marked \"taking\" at this time  Outpatient Prescriptions Marked as Taking for the 10/4/18 encounter (Office Visit) with Nora Omalley MD   Medication Sig Dispense Refill    traZODone (DESYREL) 50 MG tablet Take 1 tablet by mouth nightly as needed for Sleep 30 tablet 2    omeprazole (PRILOSEC) 40 MG delayed release capsule Take 1 capsule by mouth daily 30 capsule 2    folic acid (FOLVITE) 1 MG tablet Take 1 tablet by mouth daily 30 tablet 3    Multiple Vitamin (MULTIVITAMIN) tablet Take 1 tablet by mouth daily 30 tablet 0    vitamin B-1 100 MG tablet Take 1 tablet by mouth daily 30 tablet 3        Medications patient taking as of now reconciled against medications ordered at time of hospital discharge: Yes    Chief Complaint   Patient presents with    Follow-Up from Hospital     patient is here for a hospital follow up acute encephalopathy from urinary tract infection. She was discharged on Levaquin.   Urine

## 2018-10-14 ASSESSMENT — ENCOUNTER SYMPTOMS
SHORTNESS OF BREATH: 0
WHEEZING: 0
COUGH: 0
RHINORRHEA: 0
SORE THROAT: 0
EYE DISCHARGE: 0
SINUS PRESSURE: 0

## 2018-10-25 ENCOUNTER — OFFICE VISIT (OUTPATIENT)
Dept: FAMILY MEDICINE CLINIC | Age: 63
End: 2018-10-25
Payer: MEDICARE

## 2018-10-25 VITALS
HEART RATE: 114 BPM | BODY MASS INDEX: 33.79 KG/M2 | DIASTOLIC BLOOD PRESSURE: 90 MMHG | OXYGEN SATURATION: 98 % | WEIGHT: 173 LBS | SYSTOLIC BLOOD PRESSURE: 132 MMHG

## 2018-10-25 DIAGNOSIS — K21.9 GASTROESOPHAGEAL REFLUX DISEASE WITHOUT ESOPHAGITIS: ICD-10-CM

## 2018-10-25 DIAGNOSIS — I10 ESSENTIAL HYPERTENSION: Primary | ICD-10-CM

## 2018-10-25 DIAGNOSIS — M79.605 LEG PAIN, BILATERAL: ICD-10-CM

## 2018-10-25 DIAGNOSIS — Z12.11 SCREEN FOR COLON CANCER: ICD-10-CM

## 2018-10-25 DIAGNOSIS — D64.9 ANEMIA, UNSPECIFIED TYPE: ICD-10-CM

## 2018-10-25 DIAGNOSIS — M79.604 LEG PAIN, BILATERAL: ICD-10-CM

## 2018-10-25 DIAGNOSIS — R41.3 MEMORY LOSS: ICD-10-CM

## 2018-10-25 DIAGNOSIS — F17.200 TOBACCO USE DISORDER: ICD-10-CM

## 2018-10-25 DIAGNOSIS — Z23 NEED FOR IMMUNIZATION AGAINST INFLUENZA: ICD-10-CM

## 2018-10-25 DIAGNOSIS — Z23 NEED FOR PNEUMOCOCCAL VACCINATION: ICD-10-CM

## 2018-10-25 LAB
A/G RATIO: 1.5 (ref 1.1–2.2)
ALBUMIN SERPL-MCNC: 4.5 G/DL (ref 3.4–5)
ALP BLD-CCNC: 85 U/L (ref 40–129)
ALT SERPL-CCNC: 7 U/L (ref 10–40)
ANION GAP SERPL CALCULATED.3IONS-SCNC: 19 MMOL/L (ref 3–16)
AST SERPL-CCNC: 19 U/L (ref 15–37)
BASOPHILS ABSOLUTE: 0 K/UL (ref 0–0.2)
BASOPHILS RELATIVE PERCENT: 0.7 %
BILIRUB SERPL-MCNC: 0.4 MG/DL (ref 0–1)
BUN BLDV-MCNC: 10 MG/DL (ref 7–20)
CALCIUM SERPL-MCNC: 9.5 MG/DL (ref 8.3–10.6)
CHLORIDE BLD-SCNC: 103 MMOL/L (ref 99–110)
CO2: 21 MMOL/L (ref 21–32)
CREAT SERPL-MCNC: 1 MG/DL (ref 0.6–1.2)
EOSINOPHILS ABSOLUTE: 0.1 K/UL (ref 0–0.6)
EOSINOPHILS RELATIVE PERCENT: 2.1 %
GFR AFRICAN AMERICAN: >60
GFR NON-AFRICAN AMERICAN: 56
GLOBULIN: 3.1 G/DL
GLUCOSE BLD-MCNC: 101 MG/DL (ref 70–99)
HCT VFR BLD CALC: 27.9 % (ref 36–48)
HEMOGLOBIN: 9.1 G/DL (ref 12–16)
LYMPHOCYTES ABSOLUTE: 2.7 K/UL (ref 1–5.1)
LYMPHOCYTES RELATIVE PERCENT: 42.6 %
MCH RBC QN AUTO: 32.8 PG (ref 26–34)
MCHC RBC AUTO-ENTMCNC: 32.5 G/DL (ref 31–36)
MCV RBC AUTO: 101 FL (ref 80–100)
MONOCYTES ABSOLUTE: 0.9 K/UL (ref 0–1.3)
MONOCYTES RELATIVE PERCENT: 14.5 %
NEUTROPHILS ABSOLUTE: 2.6 K/UL (ref 1.7–7.7)
NEUTROPHILS RELATIVE PERCENT: 40.1 %
PDW BLD-RTO: 18.3 % (ref 12.4–15.4)
PLATELET # BLD: 231 K/UL (ref 135–450)
PMV BLD AUTO: 9.1 FL (ref 5–10.5)
POTASSIUM SERPL-SCNC: 4.2 MMOL/L (ref 3.5–5.1)
RBC # BLD: 2.77 M/UL (ref 4–5.2)
SODIUM BLD-SCNC: 143 MMOL/L (ref 136–145)
TOTAL PROTEIN: 7.6 G/DL (ref 6.4–8.2)
WBC # BLD: 6.4 K/UL (ref 4–11)

## 2018-10-25 PROCEDURE — 99214 OFFICE O/P EST MOD 30 MIN: CPT | Performed by: FAMILY MEDICINE

## 2018-10-25 PROCEDURE — 90732 PPSV23 VACC 2 YRS+ SUBQ/IM: CPT | Performed by: FAMILY MEDICINE

## 2018-10-25 PROCEDURE — G0008 ADMIN INFLUENZA VIRUS VAC: HCPCS | Performed by: FAMILY MEDICINE

## 2018-10-25 PROCEDURE — 90686 IIV4 VACC NO PRSV 0.5 ML IM: CPT | Performed by: FAMILY MEDICINE

## 2018-10-25 PROCEDURE — G0009 ADMIN PNEUMOCOCCAL VACCINE: HCPCS | Performed by: FAMILY MEDICINE

## 2018-10-25 PROCEDURE — 36415 COLL VENOUS BLD VENIPUNCTURE: CPT | Performed by: FAMILY MEDICINE

## 2018-10-25 RX ORDER — AMLODIPINE BESYLATE 5 MG/1
5 TABLET ORAL DAILY
Qty: 30 TABLET | Refills: 3 | Status: SHIPPED | OUTPATIENT
Start: 2018-10-25

## 2018-10-25 RX ORDER — GABAPENTIN 100 MG/1
100 CAPSULE ORAL DAILY
Qty: 90 CAPSULE | Refills: 3 | Status: SHIPPED | OUTPATIENT
Start: 2018-10-25 | End: 2019-01-07 | Stop reason: SDUPTHER

## 2018-10-28 ASSESSMENT — ENCOUNTER SYMPTOMS
SINUS PRESSURE: 0
COUGH: 0
SHORTNESS OF BREATH: 0
SORE THROAT: 0
RHINORRHEA: 0
WHEEZING: 0
EYE DISCHARGE: 0

## 2019-01-07 ENCOUNTER — OFFICE VISIT (OUTPATIENT)
Dept: FAMILY MEDICINE CLINIC | Age: 64
End: 2019-01-07
Payer: MEDICARE

## 2019-01-07 VITALS
BODY MASS INDEX: 31.95 KG/M2 | SYSTOLIC BLOOD PRESSURE: 116 MMHG | HEART RATE: 112 BPM | WEIGHT: 163.6 LBS | DIASTOLIC BLOOD PRESSURE: 82 MMHG | OXYGEN SATURATION: 98 %

## 2019-01-07 DIAGNOSIS — M79.604 LEG PAIN, BILATERAL: ICD-10-CM

## 2019-01-07 DIAGNOSIS — I10 ESSENTIAL HYPERTENSION: Primary | ICD-10-CM

## 2019-01-07 DIAGNOSIS — M79.605 LEG PAIN, BILATERAL: ICD-10-CM

## 2019-01-07 DIAGNOSIS — F03.90 DEMENTIA WITHOUT BEHAVIORAL DISTURBANCE, UNSPECIFIED DEMENTIA TYPE: ICD-10-CM

## 2019-01-07 DIAGNOSIS — D64.9 ANEMIA, UNSPECIFIED TYPE: ICD-10-CM

## 2019-01-07 PROCEDURE — 99213 OFFICE O/P EST LOW 20 MIN: CPT | Performed by: FAMILY MEDICINE

## 2019-01-07 RX ORDER — GABAPENTIN 300 MG/1
300 CAPSULE ORAL 3 TIMES DAILY
Qty: 90 CAPSULE | Refills: 5 | Status: SHIPPED | OUTPATIENT
Start: 2019-01-07 | End: 2019-04-09

## 2019-01-13 PROBLEM — I10 ESSENTIAL HYPERTENSION: Status: ACTIVE | Noted: 2019-01-13

## 2019-01-13 ASSESSMENT — ENCOUNTER SYMPTOMS
SHORTNESS OF BREATH: 0
WHEEZING: 0
SORE THROAT: 0
EYE DISCHARGE: 0
COUGH: 0
SINUS PRESSURE: 0

## 2019-01-16 ENCOUNTER — HOSPITAL ENCOUNTER (OUTPATIENT)
Age: 64
Setting detail: SPECIMEN
Discharge: HOME | End: 2019-01-16
Payer: MEDICARE

## 2019-01-16 LAB
ALBUMIN SERPL-MCNC: 3.7 GM/DL (ref 3.4–5)
ALP BLD-CCNC: 110 IU/L (ref 40–129)
ALT SERPL-CCNC: 16 U/L (ref 10–40)
ANION GAP SERPL CALCULATED.3IONS-SCNC: 17 MMOL/L (ref 4–16)
AST SERPL-CCNC: 54 IU/L (ref 15–37)
BILIRUB SERPL-MCNC: 1 MG/DL (ref 0–1)
BUN BLDV-MCNC: 18 MG/DL (ref 6–23)
CALCIUM SERPL-MCNC: 7.9 MG/DL (ref 8.3–10.6)
CHLORIDE BLD-SCNC: 97 MMOL/L (ref 99–110)
CO2: 23 MMOL/L (ref 21–32)
CREAT SERPL-MCNC: 1.6 MG/DL (ref 0.6–1.1)
ERYTHROCYTE SEDIMENTATION RATE: 48 MM/HR (ref 0–30)
FERRITIN: 2258 NG/ML (ref 15–150)
FOLATE: 16.4 NG/ML (ref 3.1–17.5)
GFR AFRICAN AMERICAN: 40 ML/MIN/1.73M2
GFR NON-AFRICAN AMERICAN: 33 ML/MIN/1.73M2
GLUCOSE BLD-MCNC: 117 MG/DL (ref 70–99)
IRON: 127 UG/DL (ref 37–145)
LACTATE DEHYDROGENASE: 279 IU/L (ref 120–246)
PCT TRANSFERRIN: 91 % (ref 10–44)
POTASSIUM SERPL-SCNC: 3.9 MMOL/L (ref 3.5–5.1)
RETICULOCYTE COUNT PCT: 0.9 % (ref 0.2–2.2)
SODIUM BLD-SCNC: 137 MMOL/L (ref 135–145)
T4 FREE: 0.88 NG/DL (ref 0.9–1.8)
TOTAL IRON BINDING CAPACITY: 140 UG/DL (ref 250–450)
TOTAL PROTEIN: 6.5 GM/DL (ref 6.4–8.2)
TSH HIGH SENSITIVITY: 2.36 UIU/ML (ref 0.27–4.2)
UNSATURATED IRON BINDING CAPACITY: 13 UG/DL (ref 110–370)
VITAMIN B-12: 539.4 PG/ML (ref 211–911)

## 2019-01-16 PROCEDURE — 86038 ANTINUCLEAR ANTIBODIES: CPT

## 2019-01-16 PROCEDURE — 83540 ASSAY OF IRON: CPT

## 2019-01-16 PROCEDURE — 84439 ASSAY OF FREE THYROXINE: CPT

## 2019-01-16 PROCEDURE — 80053 COMPREHEN METABOLIC PANEL: CPT

## 2019-01-16 PROCEDURE — 85652 RBC SED RATE AUTOMATED: CPT

## 2019-01-16 PROCEDURE — 82607 VITAMIN B-12: CPT

## 2019-01-16 PROCEDURE — 83615 LACTATE (LD) (LDH) ENZYME: CPT

## 2019-01-16 PROCEDURE — 84238 ASSAY NONENDOCRINE RECEPTOR: CPT

## 2019-01-16 PROCEDURE — 86320 SERUM IMMUNOELECTROPHORESIS: CPT

## 2019-01-16 PROCEDURE — 84443 ASSAY THYROID STIM HORMONE: CPT

## 2019-01-16 PROCEDURE — 84165 PROTEIN E-PHORESIS SERUM: CPT

## 2019-01-16 PROCEDURE — 83550 IRON BINDING TEST: CPT

## 2019-01-16 PROCEDURE — 82746 ASSAY OF FOLIC ACID SERUM: CPT

## 2019-01-16 PROCEDURE — 82728 ASSAY OF FERRITIN: CPT

## 2019-01-16 PROCEDURE — 83010 ASSAY OF HAPTOGLOBIN QUANT: CPT

## 2019-01-16 PROCEDURE — 85045 AUTOMATED RETICULOCYTE COUNT: CPT

## 2019-01-17 LAB
ALBUMIN ELP: 3.4 GM/DL (ref 3.2–5.6)
ALPHA-1-GLOBULIN: 0.3 GM/DL (ref 0.1–0.4)
ALPHA-2-GLOBULIN: 0.9 GM/DL (ref 0.4–1.2)
BETA GLOBULIN: 1.1 GM/DL (ref 0.5–1.3)
GAMMA GLOBULIN: 0.9 GM/DL (ref 0.5–1.6)
TOTAL PROTEIN: 6.5 GM/DL (ref 6.4–8.2)

## 2019-01-19 LAB
HAPTOGLOBIN: 193
Lab: NORMAL
TEST NAME: NORMAL

## 2019-01-21 LAB
ANTI-NUCLEAR ANTIBODY (ANA): NORMAL

## 2019-02-21 ENCOUNTER — HOSPITAL ENCOUNTER (OUTPATIENT)
Age: 64
Setting detail: SPECIMEN
Discharge: HOME | End: 2019-02-21
Payer: MEDICARE

## 2019-02-21 LAB
ALBUMIN SERPL-MCNC: 3.8 GM/DL (ref 3.4–5)
ALP BLD-CCNC: 105 IU/L (ref 40–129)
ALT SERPL-CCNC: 15 U/L (ref 10–40)
ANION GAP SERPL CALCULATED.3IONS-SCNC: 20 MMOL/L (ref 4–16)
AST SERPL-CCNC: 55 IU/L (ref 15–37)
BILIRUB SERPL-MCNC: 0.5 MG/DL (ref 0–1)
BUN BLDV-MCNC: 11 MG/DL (ref 6–23)
CALCIUM SERPL-MCNC: 8.1 MG/DL (ref 8.3–10.6)
CHLORIDE BLD-SCNC: 105 MMOL/L (ref 99–110)
CO2: 21 MMOL/L (ref 21–32)
CREAT SERPL-MCNC: 1.1 MG/DL (ref 0.6–1.1)
GFR AFRICAN AMERICAN: >60 ML/MIN/1.73M2
GFR NON-AFRICAN AMERICAN: 50 ML/MIN/1.73M2
GLUCOSE BLD-MCNC: 80 MG/DL (ref 70–99)
IGA: 367 MG/DL (ref 69–382)
IGG,SERUM: 968 MG/DL (ref 723–1685)
IGM,SERUM: 220 MG/DL (ref 62–277)
POTASSIUM SERPL-SCNC: 3.8 MMOL/L (ref 3.5–5.1)
RETICULOCYTE COUNT PCT: 3.4 % (ref 0.2–2.2)
SODIUM BLD-SCNC: 146 MMOL/L (ref 135–145)
TOTAL PROTEIN: 6.7 GM/DL (ref 6.4–8.2)

## 2019-02-21 PROCEDURE — 81256 HFE GENE: CPT

## 2019-02-21 PROCEDURE — 88275 CYTOGENETICS 100-300: CPT

## 2019-02-21 PROCEDURE — 83883 ASSAY NEPHELOMETRY NOT SPEC: CPT

## 2019-02-21 PROCEDURE — 88184 FLOWCYTOMETRY/ TC 1 MARKER: CPT

## 2019-02-21 PROCEDURE — 88313 SPECIAL STAINS GROUP 2: CPT

## 2019-02-21 PROCEDURE — 88271 CYTOGENETICS DNA PROBE: CPT

## 2019-02-21 PROCEDURE — 88185 FLOWCYTOMETRY/TC ADD-ON: CPT

## 2019-02-21 PROCEDURE — 88237 TISSUE CULTURE BONE MARROW: CPT

## 2019-02-21 PROCEDURE — 82784 ASSAY IGA/IGD/IGG/IGM EACH: CPT

## 2019-02-21 PROCEDURE — 88262 CHROMOSOME ANALYSIS 15-20: CPT

## 2019-02-21 PROCEDURE — 85045 AUTOMATED RETICULOCYTE COUNT: CPT

## 2019-02-21 PROCEDURE — 88305 TISSUE EXAM BY PATHOLOGIST: CPT

## 2019-02-21 PROCEDURE — 80053 COMPREHEN METABOLIC PANEL: CPT

## 2019-02-21 PROCEDURE — 88182 CELL MARKER STUDY: CPT

## 2019-02-21 PROCEDURE — 82232 ASSAY OF BETA-2 PROTEIN: CPT

## 2019-02-22 LAB — BETA-2 MICROGLOBULIN: 4

## 2019-02-24 LAB
KAPPA QUANT FREE LIGHT CHAINS: 3.12
KAPPA/LAMBDA FREE LIGHT CHAIN RATIO: 1.09
LAMBDA FREE LIGHT CHAINS URINE/ VOL: 2.86

## 2019-02-26 LAB
C282Y HEMOCHROMATOSIS MUT: NEGATIVE
H63D HEMOCHROMATOSIS MUT: NEGATIVE
HEMOCHROMATOSIS MUTATION C282Y/H63D: NORMAL
HFE PCR SPECIMEN: NORMAL
S65C HEMOCHROMATOSIS MUT: NEGATIVE

## 2019-03-05 ENCOUNTER — HOSPITAL ENCOUNTER (OUTPATIENT)
Age: 64
Setting detail: SPECIMEN
Discharge: HOME | End: 2019-03-05
Payer: MEDICARE

## 2019-03-05 LAB
ALBUMIN SERPL-MCNC: 4.2 GM/DL (ref 3.4–5)
ALP BLD-CCNC: 106 IU/L (ref 40–128)
ALT SERPL-CCNC: 13 U/L (ref 10–40)
ANION GAP SERPL CALCULATED.3IONS-SCNC: 18 MMOL/L (ref 4–16)
AST SERPL-CCNC: 34 IU/L (ref 15–37)
BILIRUB SERPL-MCNC: 0.6 MG/DL (ref 0–1)
BUN BLDV-MCNC: 5 MG/DL (ref 6–23)
CALCIUM SERPL-MCNC: 8.8 MG/DL (ref 8.3–10.6)
CHLORIDE BLD-SCNC: 101 MMOL/L (ref 99–110)
CHROMOSOME, BONE MARROW: NORMAL
CO2: 24 MMOL/L (ref 21–32)
CREAT SERPL-MCNC: 1 MG/DL (ref 0.6–1.1)
GDT REPLACEMENT: NORMAL
GFR AFRICAN AMERICAN: >60 ML/MIN/1.73M2
GFR NON-AFRICAN AMERICAN: 56 ML/MIN/1.73M2
GLUCOSE BLD-MCNC: 124 MG/DL (ref 70–99)
POTASSIUM SERPL-SCNC: 3.5 MMOL/L (ref 3.5–5.1)
SODIUM BLD-SCNC: 143 MMOL/L (ref 135–145)
TOTAL PROTEIN: 7.2 GM/DL (ref 6.4–8.2)

## 2019-03-05 PROCEDURE — 82668 ASSAY OF ERYTHROPOIETIN: CPT

## 2019-03-05 PROCEDURE — 80053 COMPREHEN METABOLIC PANEL: CPT

## 2019-03-07 LAB — ERYTHROPOIETIN: 13

## 2019-04-08 ENCOUNTER — OFFICE VISIT (OUTPATIENT)
Dept: FAMILY MEDICINE CLINIC | Age: 64
End: 2019-04-08
Payer: MEDICARE

## 2019-04-08 VITALS — BODY MASS INDEX: 29.57 KG/M2 | WEIGHT: 151.4 LBS | SYSTOLIC BLOOD PRESSURE: 130 MMHG | DIASTOLIC BLOOD PRESSURE: 88 MMHG

## 2019-04-08 DIAGNOSIS — R41.3 MEMORY LOSS: ICD-10-CM

## 2019-04-08 DIAGNOSIS — F03.90 DEMENTIA WITHOUT BEHAVIORAL DISTURBANCE, UNSPECIFIED DEMENTIA TYPE: ICD-10-CM

## 2019-04-08 DIAGNOSIS — I10 ESSENTIAL HYPERTENSION: Primary | ICD-10-CM

## 2019-04-08 DIAGNOSIS — F41.9 ANXIETY AND DEPRESSION: ICD-10-CM

## 2019-04-08 DIAGNOSIS — F32.A ANXIETY AND DEPRESSION: ICD-10-CM

## 2019-04-08 DIAGNOSIS — K21.9 GASTROESOPHAGEAL REFLUX DISEASE WITHOUT ESOPHAGITIS: ICD-10-CM

## 2019-04-08 DIAGNOSIS — B37.0 THRUSH: ICD-10-CM

## 2019-04-08 PROCEDURE — G0444 DEPRESSION SCREEN ANNUAL: HCPCS | Performed by: FAMILY MEDICINE

## 2019-04-08 PROCEDURE — 99214 OFFICE O/P EST MOD 30 MIN: CPT | Performed by: FAMILY MEDICINE

## 2019-04-08 RX ORDER — SERTRALINE HYDROCHLORIDE 25 MG/1
25 TABLET, FILM COATED ORAL DAILY
Qty: 30 TABLET | Refills: 3 | Status: SHIPPED | OUTPATIENT
Start: 2019-04-08

## 2019-04-08 RX ORDER — FLUCONAZOLE 100 MG/1
100 TABLET ORAL DAILY
Qty: 7 TABLET | Refills: 0 | Status: SHIPPED | OUTPATIENT
Start: 2019-04-08 | End: 2019-04-15

## 2019-04-08 ASSESSMENT — PATIENT HEALTH QUESTIONNAIRE - PHQ9
7. TROUBLE CONCENTRATING ON THINGS, SUCH AS READING THE NEWSPAPER OR WATCHING TELEVISION: 0
3. TROUBLE FALLING OR STAYING ASLEEP: 3
SUM OF ALL RESPONSES TO PHQ QUESTIONS 1-9: 18
2. FEELING DOWN, DEPRESSED OR HOPELESS: 3
1. LITTLE INTEREST OR PLEASURE IN DOING THINGS: 3
4. FEELING TIRED OR HAVING LITTLE ENERGY: 3
5. POOR APPETITE OR OVEREATING: 3
SUM OF ALL RESPONSES TO PHQ9 QUESTIONS 1 & 2: 6
6. FEELING BAD ABOUT YOURSELF - OR THAT YOU ARE A FAILURE OR HAVE LET YOURSELF OR YOUR FAMILY DOWN: 0
9. THOUGHTS THAT YOU WOULD BE BETTER OFF DEAD, OR OF HURTING YOURSELF: 0
SUM OF ALL RESPONSES TO PHQ QUESTIONS 1-9: 18
10. IF YOU CHECKED OFF ANY PROBLEMS, HOW DIFFICULT HAVE THESE PROBLEMS MADE IT FOR YOU TO DO YOUR WORK, TAKE CARE OF THINGS AT HOME, OR GET ALONG WITH OTHER PEOPLE: 1
8. MOVING OR SPEAKING SO SLOWLY THAT OTHER PEOPLE COULD HAVE NOTICED. OR THE OPPOSITE, BEING SO FIGETY OR RESTLESS THAT YOU HAVE BEEN MOVING AROUND A LOT MORE THAN USUAL: 3

## 2019-04-08 NOTE — PROGRESS NOTES
Caesar Kid  1955 04/21/19    Chief Complaint   Patient presents with    3 Month Follow-Up    Hypertension    Anxiety    Depression    Gastroesophageal Reflux    Dementia    Other     c/o mouth sore           Patient here for 3 months f/u regarding her HTN, GERD, and anxiety and depression, patient also c/o mouth sore going on for few days, and not able to eat, her daughter dose not know about her mouth sore. Denies fever, no cough or SOB, denies abdominal pain. Per daughter also patient is depressed and not sleeping well, denies suicidal idea or plan. Her leg pain getting better with the gabapentin.       Past Medical History:   Diagnosis Date    Hyperlipidemia     Hypertension     Obesity      Past Surgical History:   Procedure Laterality Date    BACK SURGERY       Family History   Problem Relation Age of Onset    Diabetes Mother     Cancer Father     Other Sister      Social History     Socioeconomic History    Marital status:      Spouse name: Not on file    Number of children: Not on file    Years of education: Not on file    Highest education level: Not on file   Occupational History    Not on file   Social Needs    Financial resource strain: Not on file    Food insecurity:     Worry: Not on file     Inability: Not on file    Transportation needs:     Medical: Not on file     Non-medical: Not on file   Tobacco Use    Smoking status: Current Every Day Smoker     Packs/day: 0.50     Years: 10.00     Pack years: 5.00     Types: Cigarettes    Smokeless tobacco: Never Used    Tobacco comment: quit since shes been sick   Substance and Sexual Activity    Alcohol use: Yes     Comment: 2-4 times a week    Drug use: No    Sexual activity: Not on file   Lifestyle    Physical activity:     Days per week: Not on file     Minutes per session: Not on file    Stress: Not on file   Relationships    Social connections:     Talks on phone: Not on file     Gets together: Not on environmental allergies. Neurological: Negative for dizziness, syncope and headaches. Psychiatric/Behavioral: Positive for agitation, behavioral problems, confusion, dysphoric mood and sleep disturbance. Negative for self-injury and suicidal ideas. The patient is nervous/anxious. Lab Results   Component Value Date    WBC 6.3 04/13/2019    HGB 8.1 (L) 04/13/2019    HCT 28.0 (L) 04/13/2019    .8 (H) 04/13/2019     04/13/2019     Lab Results   Component Value Date     04/13/2019    K 3.4 (L) 04/13/2019     04/13/2019    CO2 17 (L) 04/13/2019    BUN 11 04/13/2019    CREATININE 1.2 (H) 04/13/2019    GLUCOSE 85 04/13/2019    CALCIUM 7.1 (L) 04/13/2019    PROT 5.0 (L) 04/12/2019    LABALBU 49 04/13/2019    BILITOT 0.2 04/12/2019    ALKPHOS 72 04/12/2019    AST 13 (L) 04/12/2019    ALT 10 04/12/2019    LABGLOM 45 (L) 04/13/2019    GFRAA 55 (L) 04/13/2019    AGRATIO 1.5 10/25/2018    GLOB 3.1 10/25/2018     Lab Results   Component Value Date    CHOL 243 (H) 06/01/2018    CHOL 258 01/24/2014     Lab Results   Component Value Date    TRIG 196 (H) 06/01/2018    TRIG 196 01/24/2014     Lab Results   Component Value Date     06/01/2018    HDL 80 (A) 01/24/2014     Lab Results   Component Value Date    LDLCALC 56 06/01/2018    LDLCALC 141 01/24/2014     Lab Results   Component Value Date    LABA1C 5.5 01/24/2014     Lab Results   Component Value Date    TSH 2.5 01/24/2014    TSHHS 0.771 04/11/2019         /88 (Site: Left Lower Arm, Position: Sitting, Cuff Size: Medium Adult)   Wt 151 lb 6.4 oz (68.7 kg)   LMP 02/07/2012   BMI 29.57 kg/m²     BP Readings from Last 3 Encounters:   04/13/19 (!) 121/103   04/08/19 130/88   01/07/19 116/82       Wt Readings from Last 3 Encounters:   04/11/19 173 lb (78.5 kg)   04/08/19 151 lb 6.4 oz (68.7 kg)   01/07/19 163 lb 9.6 oz (74.2 kg)         Physical Exam   Constitutional: She is oriented to person, place, and time.  She appears well-developed and well-nourished. No distress. HENT:   Head: Normocephalic and atraumatic. Right Ear: External ear normal.   Left Ear: External ear normal.   Mouth/Throat: Oral lesions present. Posterior oropharyngeal edema and posterior oropharyngeal erythema present. Eyes: Pupils are equal, round, and reactive to light. Conjunctivae are normal.   Neck: Normal range of motion. Neck supple. Cardiovascular: Normal rate, regular rhythm and normal heart sounds. No murmur heard. Pulmonary/Chest: Effort normal and breath sounds normal. She has no wheezes. She has no rales. Musculoskeletal: Normal range of motion. She exhibits no edema. Lymphadenopathy:     She has no cervical adenopathy. Neurological: She is alert and oriented to person, place, and time. Skin: She is not diaphoretic. Psychiatric: Thought content normal. Her affect is blunt. Her speech is delayed. She is slowed. Cognition and memory are impaired. She exhibits a depressed mood. ASSESSMENT/ PLAN:    1. Essential hypertension  - stable    2. Gastroesophageal reflux disease without esophagitis  - stable    3. Anxiety and depression  - start:  - sertraline (ZOLOFT) 25 MG tablet; Take 1 tablet by mouth daily  Dispense: 30 tablet; Refill: 3    4. Thrush  - start:  - nystatin (MYCOSTATIN) 186866 UNIT/ML suspension; Take 5 mLs by mouth 4 times daily Swish and retain in mouth as long as possible-use 2 days after symptoms resolve  Dispense: 1 Bottle; Refill: 0  - fluconazole (DIFLUCAN) 100 MG tablet; Take 1 tablet by mouth daily for 7 days  Dispense: 7 tablet; Refill: 0    5. Memory loss  - Jessie Calvillo DO, [facility]    6. Dementia without behavioral disturbance, unspecified dementia type  - JORGE - Hernan Galdino Squalvin MCCOY, Neurology, [place]                - Appropriateprescription are addressed. - After visit summery provided.   - Questions answered and patient verbalizes understanding.  - Call for any problem,

## 2019-04-09 ENCOUNTER — HOSPITAL ENCOUNTER (INPATIENT)
Age: 64
LOS: 4 days | Discharge: ANOTHER HOSPITAL | DRG: 689 | End: 2019-04-13
Attending: EMERGENCY MEDICINE | Admitting: INTERNAL MEDICINE
Payer: MEDICARE

## 2019-04-09 ENCOUNTER — APPOINTMENT (OUTPATIENT)
Dept: GENERAL RADIOLOGY | Age: 64
DRG: 689 | End: 2019-04-09
Payer: MEDICARE

## 2019-04-09 ENCOUNTER — APPOINTMENT (OUTPATIENT)
Dept: CT IMAGING | Age: 64
DRG: 689 | End: 2019-04-09
Payer: MEDICARE

## 2019-04-09 DIAGNOSIS — R53.1 GENERALIZED WEAKNESS: Primary | ICD-10-CM

## 2019-04-09 DIAGNOSIS — N30.01 ACUTE CYSTITIS WITH HEMATURIA: ICD-10-CM

## 2019-04-09 PROBLEM — N39.0 UTI (URINARY TRACT INFECTION): Status: ACTIVE | Noted: 2019-04-09

## 2019-04-09 LAB
ALBUMIN SERPL-MCNC: 3.1 GM/DL (ref 3.4–5)
ALP BLD-CCNC: 71 IU/L (ref 40–129)
ALT SERPL-CCNC: 5 U/L (ref 10–40)
AMMONIA: 26 UMOL/L (ref 11–51)
ANION GAP SERPL CALCULATED.3IONS-SCNC: 11 MMOL/L (ref 4–16)
AST SERPL-CCNC: 15 IU/L (ref 15–37)
BACTERIA: ABNORMAL /HPF
BASOPHILS ABSOLUTE: 0 K/CU MM
BASOPHILS RELATIVE PERCENT: 0.3 % (ref 0–1)
BILIRUB SERPL-MCNC: 0.5 MG/DL (ref 0–1)
BILIRUBIN URINE: NEGATIVE MG/DL
BLOOD, URINE: NEGATIVE
BUN BLDV-MCNC: 18 MG/DL (ref 6–23)
CALCIUM SERPL-MCNC: 8 MG/DL (ref 8.3–10.6)
CHLORIDE BLD-SCNC: 100 MMOL/L (ref 99–110)
CLARITY: ABNORMAL
CO2: 26 MMOL/L (ref 21–32)
COLOR: ABNORMAL
CREAT SERPL-MCNC: 1 MG/DL (ref 0.6–1.1)
DIFFERENTIAL TYPE: ABNORMAL
EKG ATRIAL RATE: 96 BPM
EKG DIAGNOSIS: NORMAL
EKG P AXIS: 37 DEGREES
EKG P-R INTERVAL: 144 MS
EKG Q-T INTERVAL: 356 MS
EKG QRS DURATION: 64 MS
EKG QTC CALCULATION (BAZETT): 449 MS
EKG R AXIS: -17 DEGREES
EKG T AXIS: -4 DEGREES
EKG VENTRICULAR RATE: 96 BPM
EOSINOPHILS ABSOLUTE: 0 K/CU MM
EOSINOPHILS RELATIVE PERCENT: 0.3 % (ref 0–3)
FOLATE: 13.8 NG/ML (ref 3.1–17.5)
GFR AFRICAN AMERICAN: >60 ML/MIN/1.73M2
GFR NON-AFRICAN AMERICAN: 56 ML/MIN/1.73M2
GLUCOSE BLD-MCNC: 119 MG/DL (ref 70–99)
GLUCOSE, URINE: NEGATIVE MG/DL
HCT VFR BLD CALC: 33.4 % (ref 37–47)
HEMOGLOBIN: 10.3 GM/DL (ref 12.5–16)
IMMATURE NEUTROPHIL %: 0.3 % (ref 0–0.43)
KETONES, URINE: ABNORMAL MG/DL
LEUKOCYTE ESTERASE, URINE: ABNORMAL
LYMPHOCYTES ABSOLUTE: 1.9 K/CU MM
LYMPHOCYTES RELATIVE PERCENT: 17.2 % (ref 24–44)
MCH RBC QN AUTO: 31.2 PG (ref 27–31)
MCHC RBC AUTO-ENTMCNC: 30.8 % (ref 32–36)
MCV RBC AUTO: 101.2 FL (ref 78–100)
MONOCYTES ABSOLUTE: 1.6 K/CU MM
MONOCYTES RELATIVE PERCENT: 14.8 % (ref 0–4)
MUCUS: ABNORMAL HPF
NITRITE URINE, QUANTITATIVE: NEGATIVE
PDW BLD-RTO: 14.2 % (ref 11.7–14.9)
PH, URINE: 8 (ref 5–8)
PLATELET # BLD: 293 K/CU MM (ref 140–440)
PMV BLD AUTO: 10.1 FL (ref 7.5–11.1)
POTASSIUM SERPL-SCNC: ABNORMAL MMOL/L (ref 3.5–5.1)
PROCALCITONIN: 0.18
PROTEIN UA: >500 MG/DL
RBC # BLD: 3.3 M/CU MM (ref 4.2–5.4)
RBC URINE: ABNORMAL /HPF (ref 0–6)
SEGMENTED NEUTROPHILS ABSOLUTE COUNT: 7.3 K/CU MM
SEGMENTED NEUTROPHILS RELATIVE PERCENT: 67.1 % (ref 36–66)
SODIUM BLD-SCNC: 137 MMOL/L (ref 135–145)
SPECIFIC GRAVITY UA: 1.01 (ref 1–1.03)
SQUAMOUS EPITHELIAL: 1 /HPF
TOTAL CK: 37 IU/L (ref 26–140)
TOTAL IMMATURE NEUTOROPHIL: 0.03 K/CU MM
TOTAL PROTEIN: 6.4 GM/DL (ref 6.4–8.2)
TRICHOMONAS: ABNORMAL /HPF
TROPONIN T: <0.01 NG/ML
TSH HIGH SENSITIVITY: 0.73 UIU/ML (ref 0.27–4.2)
UROBILINOGEN, URINE: 1 MG/DL (ref 0.2–1)
VITAMIN B-12: 422.1 PG/ML (ref 211–911)
WBC # BLD: 10.9 K/CU MM (ref 4–10.5)
WBC CLUMP: ABNORMAL /HPF
WBC UA: 104 /HPF (ref 0–5)

## 2019-04-09 PROCEDURE — 71045 X-RAY EXAM CHEST 1 VIEW: CPT

## 2019-04-09 PROCEDURE — 84145 PROCALCITONIN (PCT): CPT

## 2019-04-09 PROCEDURE — 96365 THER/PROPH/DIAG IV INF INIT: CPT

## 2019-04-09 PROCEDURE — 85025 COMPLETE CBC W/AUTO DIFF WBC: CPT

## 2019-04-09 PROCEDURE — 87040 BLOOD CULTURE FOR BACTERIA: CPT

## 2019-04-09 PROCEDURE — 82746 ASSAY OF FOLIC ACID SERUM: CPT

## 2019-04-09 PROCEDURE — 70450 CT HEAD/BRAIN W/O DYE: CPT

## 2019-04-09 PROCEDURE — 81001 URINALYSIS AUTO W/SCOPE: CPT

## 2019-04-09 PROCEDURE — 6360000002 HC RX W HCPCS: Performed by: EMERGENCY MEDICINE

## 2019-04-09 PROCEDURE — 93010 ELECTROCARDIOGRAM REPORT: CPT | Performed by: INTERNAL MEDICINE

## 2019-04-09 PROCEDURE — 87086 URINE CULTURE/COLONY COUNT: CPT

## 2019-04-09 PROCEDURE — 2580000003 HC RX 258: Performed by: EMERGENCY MEDICINE

## 2019-04-09 PROCEDURE — 82140 ASSAY OF AMMONIA: CPT

## 2019-04-09 PROCEDURE — 1200000000 HC SEMI PRIVATE

## 2019-04-09 PROCEDURE — 82607 VITAMIN B-12: CPT

## 2019-04-09 PROCEDURE — 6370000000 HC RX 637 (ALT 250 FOR IP): Performed by: EMERGENCY MEDICINE

## 2019-04-09 PROCEDURE — 84484 ASSAY OF TROPONIN QUANT: CPT

## 2019-04-09 PROCEDURE — 80053 COMPREHEN METABOLIC PANEL: CPT

## 2019-04-09 PROCEDURE — 82550 ASSAY OF CK (CPK): CPT

## 2019-04-09 PROCEDURE — 36415 COLL VENOUS BLD VENIPUNCTURE: CPT

## 2019-04-09 PROCEDURE — 87077 CULTURE AEROBIC IDENTIFY: CPT

## 2019-04-09 PROCEDURE — 93005 ELECTROCARDIOGRAM TRACING: CPT | Performed by: EMERGENCY MEDICINE

## 2019-04-09 PROCEDURE — 87186 SC STD MICRODIL/AGAR DIL: CPT

## 2019-04-09 PROCEDURE — 84443 ASSAY THYROID STIM HORMONE: CPT

## 2019-04-09 PROCEDURE — 99285 EMERGENCY DEPT VISIT HI MDM: CPT

## 2019-04-09 RX ORDER — 0.9 % SODIUM CHLORIDE 0.9 %
1000 INTRAVENOUS SOLUTION INTRAVENOUS ONCE
Status: COMPLETED | OUTPATIENT
Start: 2019-04-09 | End: 2019-04-09

## 2019-04-09 RX ORDER — POTASSIUM CHLORIDE 20 MEQ/1
40 TABLET, EXTENDED RELEASE ORAL ONCE
Status: DISCONTINUED | OUTPATIENT
Start: 2019-04-09 | End: 2019-04-09

## 2019-04-09 RX ORDER — SODIUM CHLORIDE 9 MG/ML
INJECTION, SOLUTION INTRAVENOUS CONTINUOUS
Status: DISCONTINUED | OUTPATIENT
Start: 2019-04-09 | End: 2019-04-12

## 2019-04-09 RX ORDER — SODIUM CHLORIDE 0.9 % (FLUSH) 0.9 %
10 SYRINGE (ML) INJECTION EVERY 12 HOURS SCHEDULED
Status: DISCONTINUED | OUTPATIENT
Start: 2019-04-09 | End: 2019-04-13 | Stop reason: HOSPADM

## 2019-04-09 RX ORDER — M-VIT,TX,IRON,MINS/CALC/FOLIC 27MG-0.4MG
1 TABLET ORAL DAILY
Status: DISCONTINUED | OUTPATIENT
Start: 2019-04-09 | End: 2019-04-13 | Stop reason: HOSPADM

## 2019-04-09 RX ORDER — SODIUM CHLORIDE 0.9 % (FLUSH) 0.9 %
10 SYRINGE (ML) INJECTION PRN
Status: DISCONTINUED | OUTPATIENT
Start: 2019-04-09 | End: 2019-04-13 | Stop reason: HOSPADM

## 2019-04-09 RX ORDER — PANTOPRAZOLE SODIUM 40 MG/1
40 TABLET, DELAYED RELEASE ORAL
Status: DISCONTINUED | OUTPATIENT
Start: 2019-04-10 | End: 2019-04-10

## 2019-04-09 RX ORDER — POTASSIUM BICARBONATE 25 MEQ/1
50 TABLET, EFFERVESCENT ORAL ONCE
Status: COMPLETED | OUTPATIENT
Start: 2019-04-09 | End: 2019-04-09

## 2019-04-09 RX ORDER — AMLODIPINE BESYLATE 5 MG/1
5 TABLET ORAL DAILY
Status: DISCONTINUED | OUTPATIENT
Start: 2019-04-09 | End: 2019-04-12

## 2019-04-09 RX ORDER — FOLIC ACID 1 MG/1
1 TABLET ORAL DAILY
Status: DISCONTINUED | OUTPATIENT
Start: 2019-04-09 | End: 2019-04-13 | Stop reason: HOSPADM

## 2019-04-09 RX ORDER — GABAPENTIN 300 MG/1
300 CAPSULE ORAL 3 TIMES DAILY
Status: DISCONTINUED | OUTPATIENT
Start: 2019-04-09 | End: 2019-04-13

## 2019-04-09 RX ORDER — THIAMINE MONONITRATE (VIT B1) 100 MG
100 TABLET ORAL DAILY
Status: DISCONTINUED | OUTPATIENT
Start: 2019-04-09 | End: 2019-04-10

## 2019-04-09 RX ORDER — ONDANSETRON 2 MG/ML
4 INJECTION INTRAMUSCULAR; INTRAVENOUS EVERY 6 HOURS PRN
Status: DISCONTINUED | OUTPATIENT
Start: 2019-04-09 | End: 2019-04-13 | Stop reason: HOSPADM

## 2019-04-09 RX ORDER — TRAZODONE HYDROCHLORIDE 50 MG/1
50 TABLET ORAL NIGHTLY PRN
Status: DISCONTINUED | OUTPATIENT
Start: 2019-04-09 | End: 2019-04-13 | Stop reason: HOSPADM

## 2019-04-09 RX ADMIN — POTASSIUM BICARBONATE 50 MEQ: 25 TABLET, EFFERVESCENT ORAL at 15:29

## 2019-04-09 RX ADMIN — SODIUM CHLORIDE 1000 ML: 9 INJECTION, SOLUTION INTRAVENOUS at 13:20

## 2019-04-09 RX ADMIN — CEFTRIAXONE SODIUM 1 G: 1 INJECTION, POWDER, FOR SOLUTION INTRAMUSCULAR; INTRAVENOUS at 17:33

## 2019-04-09 NOTE — ED NOTES
Call light answered. Provided pt with warm blanket per request. Assessed for other needs, pt denied.      Carey Rae RN  04/09/19 1640

## 2019-04-09 NOTE — ED PROVIDER NOTES
Triage Chief Complaint:   Fatigue (generalized weakness, legs have been weak x couple days)    [place]:  Beronica Oquendo is a 62 y.o. female that presents to the ED with gernealized weaness and fall. Patient having decreased oral intake over the past few weeks due to regugitation. No chest pain, shortness of breath. Lives alone at home. Found down at home according to her lifealert. Daughter at bedside reports confusion and hallucinations. Patient now unable to walk today secondary to weakness. She has had approximately 3 falls over the past 2 weeks.     ROS:  General:  No fevers, no chills, +weakness  Eyes:  No recent vison changes, no discharge  ENT:  No sore throat, no nasal congestion, no hearing changes  Cardiovascular:  No chest pain, no palpitations  Respiratory:  No shortness of breath, no cough, no wheezing  Gastrointestinal:  No pain, no nausea, +vomiting, no diarrhea  Musculoskeletal:  No muscle pain, no joint pain  Skin:  No rash, no pruritis, no easy bruising  Neurologic:  No speech problems, no headache, no extremity numbness, no extremity tingling, no extremity weakness  Psychiatric:  No anxiety  Genitourinary:  No dysuria, no hematuria  Endocrine:  No unexpected weight gain, no unexpected weight loss  Extremities:  no edema, no pain    Past Medical History:   Diagnosis Date    Hyperlipidemia     Hypertension     Obesity      Past Surgical History:   Procedure Laterality Date    BACK SURGERY       Family History   Problem Relation Age of Onset    Diabetes Mother     Cancer Father     Other Sister      Social History     Socioeconomic History    Marital status:      Spouse name: Not on file    Number of children: Not on file    Years of education: Not on file    Highest education level: Not on file   Occupational History    Not on file   Social Needs    Financial resource strain: Not on file    Food insecurity:     Worry: Not on file     Inability: Not on file   [employer] needs:      Medical: Not on file     Non-medical: Not on file   Tobacco Use    Smoking status: Current Every Day Smoker     Packs/day: 0.50     Years: 10.00     Pack years: 5.00     Types: Cigarettes    Smokeless tobacco: Never Used    Tobacco comment: quit since shes been sick   Substance and Sexual Activity    Alcohol use: Yes     Comment: 2-4 times a week    Drug use: No    Sexual activity: Not on file   Lifestyle    Physical activity:     Days per week: Not on file     Minutes per session: Not on file    Stress: Not on file   Relationships    Social connections:     Talks on phone: Not on file     Gets together: Not on file     Attends Jain service: Not on file     Active member of club or organization: Not on file     Attends meetings of clubs or organizations: Not on file     Relationship status: Not on file    Intimate partner violence:     Fear of current or ex partner: Not on file     Emotionally abused: Not on file     Physically abused: Not on file     Forced sexual activity: Not on file   Other Topics Concern    Not on file   Social History Narrative    Not on file     Current Facility-Administered Medications   Medication Dose Route Frequency Provider Last Rate Last Dose    folic acid (FOLVITE) tablet 1 mg  1 mg Oral Daily Johny Rivas MD        therapeutic multivitamin-minerals 1 tablet  1 tablet Oral Daily Johny Rivas MD        [START ON 4/10/2019] pantoprazole (PROTONIX) tablet 40 mg  40 mg Oral QAM AC Johny Rivas MD        sertraline (ZOLOFT) tablet 25 mg  25 mg Oral Daily Johny Rivas MD        traZODone (DESYREL) tablet 50 mg  50 mg Oral Nightly PRN Johny Rivas MD        vitamin B-1 (THIAMINE) tablet 100 mg  100 mg Oral Daily Johny Rivas MD        sodium chloride flush 0.9 % injection 10 mL  10 mL Intravenous 2 times per day Johny Rivas MD        sodium chloride flush 0.9 % injection 10 mL  10 mL Intravenous PRN Terryann Most Demar Rodriguez MD        magnesium hydroxide (MILK OF MAGNESIA) 400 MG/5ML suspension 30 mL  30 mL Oral Daily PRN Aimee Walsh MD        ondansetron [facility]) injection 4 mg  4 mg Intravenous Q6H PRN Aimee Walsh MD        [START ON 4/10/2019] enoxaparin (LOVENOX) injection 40 mg  40 mg Subcutaneous Daily Aimee Walsh MD        0.9 % sodium chloride infusion   Intravenous Continuous Aimee Walsh MD         Allergies   Allergen Reactions    Amoxicillin-Pot Clavulanate Rash       Nursing Notes Reviewed    Physical Exam:  ED Triage Vitals   Enc Vitals Group      BP 04/09/19 1301 122/89      Pulse 04/09/19 1301 100      Resp 04/09/19 1301 22      Temp 04/09/19 1303 98.1 °F (36.7 °C)      Temp Source 04/09/19 1303 Oral      SpO2 04/09/19 1307 100 %      Weight 04/09/19 1303 151 lb (68.5 kg)      Height 04/09/19 1303 5' (1.524 m)      Head Circumference --       Peak Flow --       Pain Score --       Pain Loc --       Pain Edu? --       Excl. in [address]? --        My pulse ox interpretation is - normal    General appearance:  No acute distress. Skin:  Warm. Dry. Eye:  Extraocular movements intact. Ears, nose, mouth and throat:  Oral mucosa moist   Neck:  Trachea midline. Extremity:  No swelling. Normal ROM     Heart:  Regular rate and rhythm, normal S1 & S2, no extra heart sounds. Perfusion:  intact  Respiratory:  Lungs clear to auscultation bilaterally. Respirations nonlabored. Abdominal:  Normal bowel sounds. Soft. Nontender. Non distended. Back:  No CVA tenderness to palpation   NEUROLOGICAL: Awake and alert. GCS 15. Cranial nerves 2-12 grossly intact. Strength 5/5 throughout. Light touch sensation intact throughout. Finger to nose WNL.            Psychiatric:  Appropriate    I have reviewed and interpreted all of the currently available lab results from this visit (if applicable):  Results for orders placed or performed during the hospital encounter of 04/09/19   CBC Auto Differential   Result Value Ref Range    WBC 10.9 (H) 4.0 - 10.5 K/CU MM    RBC 3.30 (L) 4.2 - 5.4 M/CU MM    Hemoglobin 10.3 (L) 12.5 - 16.0 GM/DL    Hematocrit 33.4 (L) 37 - 47 %    .2 (H) 78 - 100 FL    MCH 31.2 (H) 27 - 31 PG    MCHC 30.8 (L) 32.0 - 36.0 %    RDW 14.2 11.7 - 14.9 %    Platelets 360 603 - 514 K/CU MM    MPV 10.1 7.5 - 11.1 FL    Immature Neutrophil % 0.3 0 - 0.43 %    Segs Relative 67.1 (H) 36 - 66 %    Eosinophils % 0.3 0 - 3 %    Basophils % 0.3 0 - 1 %    Lymphocytes % 17.2 (L) 24 - 44 %    Monocytes % 14.8 (H) 0 - 4 %    Total Immature Neutrophil 0.03 K/CU MM    Segs Absolute 7.3 K/CU MM    Eosinophils # 0.0 K/CU MM    Basophils # 0.0 K/CU MM    Lymphocytes # 1.9 K/CU MM    Monocytes # 1.6 K/CU MM    Differential Type AUTOMATED DIFFERENTIAL    CMP   Result Value Ref Range    Sodium 137 135 - 145 MMOL/L    Potassium (LL) 3.5 - 5.1 MMOL/L     3.0  K CALLED ED, DR YOUNG AT 1416 4.9.19 BY ADELSO MLT  RESULTS READ BACK      Chloride 100 99 - 110 mMol/L    CO2 26 21 - 32 MMOL/L    BUN 18 6 - 23 MG/DL    CREATININE 1.0 0.6 - 1.1 MG/DL    Glucose 119 (H) 70 - 99 MG/DL    Calcium 8.0 (L) 8.3 - 10.6 MG/DL    Alb 3.1 (L) 3.4 - 5.0 GM/DL    Total Protein 6.4 6.4 - 8.2 GM/DL    Total Bilirubin 0.5 0.0 - 1.0 MG/DL    ALT 5 (L) 10 - 40 U/L    AST 15 15 - 37 IU/L    Alkaline Phosphatase 71 40 - 129 IU/L    GFR Non- 56 (L) >60 mL/min/1.73m2    GFR African American >60 >60 mL/min/1.73m2    Anion Gap 11 4 - 16   Urinalysis Reflex to Culture   Result Value Ref Range    Color, UA MATTHEW (A) YELLOW    Clarity, UA HAZY (A) CLEAR    Glucose, Urine NEGATIVE NEGATIVE MG/DL    Bilirubin Urine NEGATIVE NEGATIVE MG/DL    Ketones, Urine SMALL (A) NEGATIVE MG/DL    Specific Gravity, UA 1.015 1.001 - 1.035    Blood, Urine NEGATIVE NEGATIVE    pH, Urine 8.0 5.0 - 8.0    Protein, UA >500 (HH) NEGATIVE MG/DL    Urobilinogen, Urine 1 0.2 - 1.0 MG/DL    Nitrite Urine, Quantitative NEGATIVE NEGATIVE Leukocyte Esterase, Urine LARGE (A) NEGATIVE    RBC, UA NONE SEEN 0 - 6 /HPF    WBC,  (H) 0 - 5 /HPF    Bacteria, UA RARE (A) NEGATIVE /HPF    WBC Clumps, UA RARE /HPF    Squam Epithel, UA 1 /HPF    Mucus, UA RARE (A) NEGATIVE HPF    Trichomonas, UA NONE SEEN NONE SEEN /HPF   Troponin   Result Value Ref Range    Troponin T <0.010 <0.01 NG/ML   CK   Result Value Ref Range    Total CK 37 26 - 140 IU/L   TSH without Reflex   Result Value Ref Range    TSH, High Sensitivity 0.734 0.270 - 4.20 uIu/ml   EKG 12 Lead   Result Value Ref Range    Ventricular Rate 96 BPM    Atrial Rate 96 BPM    P-R Interval 144 ms    QRS Duration 64 ms    Q-T Interval 356 ms    QTc Calculation (Bazett) 449 ms    P Axis 37 degrees    R Axis -17 degrees    T Axis -4 degrees    Diagnosis       Normal sinus rhythm  Low voltage QRS  Nonspecific T wave abnormality  Abnormal ECG  When compared with ECG of 24-SEP-2018 10:59,  aberrant conduction is no longer present    Confirmed by [facility] Agapito MARION ([id number]) on 4/9/2019 2:08:48 PM        Radiographs (if obtained):  [] The following radiograph was interpreted by myself in the absence of a radiologist:   [x] Radiologist's Report Reviewed:  CT Head WO Contrast   Final Result   No acute intracranial abnormality. XR CHEST PORTABLE   Final Result   1. No active pulmonary disease. EKG (if obtained):  12 lead EKG per my interpretation:  Normal Sinus Rhythm 96  Axis is   Left axis deviation  QTc is  within an acceptable range  There is no specific T wave changes appreciated. There is no specific ST wave changes appreciated. Prior EKG to compare with was available 9/24/2018    Chart review shows recent radiographs:  No results found. MDM:  2year-old presenting with generalized weakness and frequent falls. Given history and physical will evaluate for electrolyte versus infectious etiology.  Labs noted to be remarkable for large leuks and large WBCs suspicious for urinary tract infection. Leukocytosis noted compared to previous lab draw. Notable hypokalemia was given K-Lyte for replenishment. TSH within normal limits CK normal. CAT scan of the head was also unremarkable. Patient was admitted to the hospital for generalized weakness contrary to possible UTI. She remained stable in the ED. I admitted the patient to the hospitalist.     Clinical Impression:  1. Generalized weakness    2. Pneumonia due to organism    3. Acute cystitis with hematuria      Disposition referral (if applicable):  Dhaval Ferguson MD  [address]  [phone]          Disposition medications (if applicable):  Current Discharge Medication List          Comment: Please note this report has been produced using speech recognition software and may contain errors related to that system including errors in grammar, punctuation, and spelling, as well as words and phrases that may be inappropriate. If there are any questions or concerns please feel free to contact the dictating provider for clarification.         Maddie Latham MD  04/09/19 2006

## 2019-04-09 NOTE — H&P
History and Physical      Name:  Nanci Schmidt /Age/Sex: 1955  (62 y.o. female)   MRN & CSN:  [id number] & [id number] Admission Date/Time: 2019 12:55 PM   Location:  ED18/ED-18 PCP: Genaro Cohen MD       Hospital Day: 1    Assessment and Plan:   Nanci Schmidt is a 62 y.o.  female  who presents with:    Weakness with multiple falls  -unable to walk in ER  -could be due to UTI and dehydration  -will hydrate and treat UTI    Abnormal UA - with perineal irritation - likely UTI  -will treat with IV abx  -noted hx ESBL E coli and prior cx from September  -will IV Bactrim    Confusion and hallucinations reported by family - metabolic encephalopathy  -could be due to UTI  -per neurology note in December dementia or Wernicke- Korsakoff syndrome was questioned  -will consult neurology    Alcohol use disorder per chart  -will get more hx from family  -thiamine    Sore mouth - probable oral thrush  -continue Nystatin which was started as an outpatient    Poor oral intake for a few weeks due to reported regurgitation  -hydrate    Hypokalemia with K level 3.0  -supplement    Low risk MDS - per oncology note family chose darbopoetin option - she sees Dr. Devan Mock    History of liver disease per chart  -unconfirmed. I did not that [address] in 2018 showed diffuse fatty infiltration of the liver. HTN - amlodipine    Tob use - nicotine patch    Body mass index is 29.49 kg/m². History of Present Illness:     Chief Complaint:   Nanci Schmidt is a 62 y.o.  female  who presents with weakness and falls. History form patient - she is here with weakness. Patient stated \"I kept losing energy\" and \"every time I get up and walk could not\". She stated she called for help. Duration of symptoms: pt not sure, but per ER had 2 weeks    Intensity of symptoms: had about 3 falls in 2 weeks per ER    Associated symptoms: poor oral intake, sore mouth - Nystatin prescribed. Denies fever.      Pain: none    Surrogate decision maker: per patient it would her daughter - Sylive Martinez    Code status: patient stated \"it depends, let me talk to my daughter\"  - but OK for full code for now per patient      10-14 point ROS reviewed negative, unless as noted above  -no cp or dyspnea  -no fever    Objective:   No intake or output data in the 24 hours ending 04/09/19 1648   Vitals:   Vitals:    04/09/19 1307   BP:    Pulse: 98   Resp: 16   Temp:    SpO2: 100%     Physical Exam:    GEN Awake female, well developed  EYES Pupils are equally round. No scleral erythema, discharge, or conjunctivitis. HENT Mucous membranes are dry  RESP Clear to auscultation, no wheezes, rales or rhonchi. Symmetric chest movement while on room air. CARDIO/VASC S1/S2 auscultated. Regular rate without appreciable murmurs, rubs, or gallops. GI Abdomen is soft without significant tenderness   James catheter is not present. SKIN Normal coloration, warm, dry. NEURO Cranial nerves appear grossly intact, normal speech, no lateralizing weakness. Past Medical History: PMHx:   Past Medical History:   Diagnosis Date    Hyperlipidemia     Hypertension     Obesity      Patient Active Problem List    Diagnosis Date Noted    UTI (urinary tract infection) 04/09/2019    Essential hypertension 01/13/2019    Encephalopathy acute 09/24/2018    Pneumonia 21/75/5302    Complicated UTI (urinary tract infection) 09/18/2018    Anemia 06/05/2018    Tobacco use disorder 05/24/2018    Dysphagia 05/24/2018    Fatigue 05/24/2018       PSHx:  has a past surgical history that includes back surgery. Allergies: Allergies   Allergen Reactions    Amoxicillin-Pot Clavulanate Rash     Home Medications:   Prior to Admission medications    Medication Sig Start Date End Date Taking?  Authorizing Provider   nystatin (MYCOSTATIN) 561242 UNIT/ML suspension Take 5 mLs by mouth 4 times daily Swish and retain in mouth as long as possible-use 2 days after symptoms resolve 4/8/19   Kisha Hamilton

## 2019-04-09 NOTE — ED NOTES
Cleaned pt up of diarrhea, pt has reddened area to buttucks, cream applied to pt.       Oracio Reece RN  04/09/19 5804

## 2019-04-09 NOTE — PROGRESS NOTES
Medication History  [facility]    Patient Name: Vanessa Evans 1955     Medication history has been completed by: Braeden Casas CPhT    Source(s) of information: patient's family and insurance claims     Primary Care Physician: Pieter Polanco MD     Pharmacy: [address] as of 04/09/2019 - Review Complete 04/09/2019   Allergen Reaction Noted    Amoxicillin-pot clavulanate Rash 09/26/2018        Prior to Admission medications    Medication Sig Start Date End Date Taking? Authorizing Provider   nystatin (MYCOSTATIN) 514029 UNIT/ML suspension Take 5 mLs by mouth 4 times daily Swish and retain in mouth as long as possible-use 2 days after symptoms resolve 4/8/19  Yes Pieter Polanco MD   fluconazole (DIFLUCAN) 100 MG tablet Take 1 tablet by mouth daily for 7 days 4/8/19 4/15/19 Yes Pieter Polanco MD   traZODone (DESYREL) 50 MG tablet TAKE ONE (1) TABLET BY MOUTH AT BEDTIME AS NEEDED FOR SLEEP 2/4/19  Yes Pieter Polanco MD   gabapentin (NEURONTIN) 300 MG capsule   Patient taking differently: Take 900 mg by mouth nightly. 1/7/19  Yes Pieter Polanco MD   amLODIPine (NORVASC) 5 MG tablet Take 1 tablet by mouth daily 10/25/18  Yes Pieter Polanco MD   omeprazole (PRILOSEC) 40 MG delayed release capsule Take 1 capsule by mouth daily 10/4/18  Yes Eligio He MD   sertraline (ZOLOFT) 25 MG tablet Take 1 tablet by mouth daily 4/8/19   Pieter Polanco MD   vitamin B-1 100 MG tablet Take 1 tablet by mouth daily 9/29/18   Gloria Franklin MD     Medications added or changed (ex.  new medication, dosage change, interval change, formulation change):  Gabapentin dosage clarified patient takes 900 mg at HS (not ordered by admitting physician)    Medications removed from list (include reason, ex. noncompliance, medication cost, therapy complete etc.):   MVI patient no longer takes (ordered by admitting physician)  Folic acid patient no longer takes (ordered by admitting physician)  Lidocaine patch no data claims listed per family does not use  Diphenhydramine-zinc cream no data claims listed per family does not use  Walker clean up list  Wheel chair clean up list     Medications requiring reconciliation with provider:    Gabapentin dosage clarified patient takes 900 mg at HS (not ordered by admitting physician)  MVI patient no longer takes (ordered by admitting physician)  Folic acid patient no longer takes (ordered by admitting physician)    Other Comments:  Medication list reviewed with family and insurance claims verified  Family states the only medications patient took today are the nystatin swish and swallow and fluconazole took scheduled meds yesterday. Family states patient has not yet started the sertraline d/t not eating.      To my knowledge the above medication history is accurate as of 4/9/2019 5:29 PM.   Fermin Sampson CPhT   4/9/2019 5:29 PM

## 2019-04-10 ENCOUNTER — APPOINTMENT (OUTPATIENT)
Dept: GENERAL RADIOLOGY | Age: 64
DRG: 689 | End: 2019-04-10
Payer: MEDICARE

## 2019-04-10 ENCOUNTER — APPOINTMENT (OUTPATIENT)
Dept: CT IMAGING | Age: 64
DRG: 689 | End: 2019-04-10
Payer: MEDICARE

## 2019-04-10 PROBLEM — E43 SEVERE MALNUTRITION (HCC): Status: ACTIVE | Noted: 2019-04-10

## 2019-04-10 LAB
ANION GAP SERPL CALCULATED.3IONS-SCNC: 10 MMOL/L (ref 4–16)
BUN BLDV-MCNC: 15 MG/DL (ref 6–23)
CALCIUM SERPL-MCNC: 7.7 MG/DL (ref 8.3–10.6)
CHLORIDE BLD-SCNC: 100 MMOL/L (ref 99–110)
CO2: 27 MMOL/L (ref 21–32)
CREAT SERPL-MCNC: 0.7 MG/DL (ref 0.6–1.1)
GFR AFRICAN AMERICAN: >60 ML/MIN/1.73M2
GFR NON-AFRICAN AMERICAN: >60 ML/MIN/1.73M2
GLUCOSE BLD-MCNC: 119 MG/DL (ref 70–99)
HCT VFR BLD CALC: 31.5 % (ref 37–47)
HEMOGLOBIN: 9.6 GM/DL (ref 12.5–16)
MAGNESIUM: 0.9 MG/DL (ref 1.8–2.4)
MCH RBC QN AUTO: 30.9 PG (ref 27–31)
MCHC RBC AUTO-ENTMCNC: 30.5 % (ref 32–36)
MCV RBC AUTO: 101.3 FL (ref 78–100)
PDW BLD-RTO: 14.4 % (ref 11.7–14.9)
PHOSPHORUS: 2.6 MG/DL (ref 2.5–4.9)
PLATELET # BLD: 310 K/CU MM (ref 140–440)
PMV BLD AUTO: 10 FL (ref 7.5–11.1)
POTASSIUM SERPL-SCNC: 3.2 MMOL/L (ref 3.5–5.1)
RBC # BLD: 3.11 M/CU MM (ref 4.2–5.4)
SODIUM BLD-SCNC: 137 MMOL/L (ref 135–145)
WBC # BLD: 10.5 K/CU MM (ref 4–10.5)

## 2019-04-10 PROCEDURE — 97535 SELF CARE MNGMENT TRAINING: CPT

## 2019-04-10 PROCEDURE — 6360000002 HC RX W HCPCS: Performed by: INTERNAL MEDICINE

## 2019-04-10 PROCEDURE — 36415 COLL VENOUS BLD VENIPUNCTURE: CPT

## 2019-04-10 PROCEDURE — 92610 EVALUATE SWALLOWING FUNCTION: CPT | Performed by: SPEECH-LANGUAGE PATHOLOGIST

## 2019-04-10 PROCEDURE — 74178 CT ABD&PLV WO CNTR FLWD CNTR: CPT

## 2019-04-10 PROCEDURE — 97530 THERAPEUTIC ACTIVITIES: CPT

## 2019-04-10 PROCEDURE — 2580000003 HC RX 258: Performed by: INTERNAL MEDICINE

## 2019-04-10 PROCEDURE — 2500000003 HC RX 250 WO HCPCS: Performed by: INTERNAL MEDICINE

## 2019-04-10 PROCEDURE — 83735 ASSAY OF MAGNESIUM: CPT

## 2019-04-10 PROCEDURE — 6370000000 HC RX 637 (ALT 250 FOR IP): Performed by: INTERNAL MEDICINE

## 2019-04-10 PROCEDURE — 94761 N-INVAS EAR/PLS OXIMETRY MLT: CPT

## 2019-04-10 PROCEDURE — 99211 OFF/OP EST MAY X REQ PHY/QHP: CPT

## 2019-04-10 PROCEDURE — 51702 INSERT TEMP BLADDER CATH: CPT

## 2019-04-10 PROCEDURE — 80048 BASIC METABOLIC PNL TOTAL CA: CPT

## 2019-04-10 PROCEDURE — 6370000000 HC RX 637 (ALT 250 FOR IP): Performed by: FAMILY MEDICINE

## 2019-04-10 PROCEDURE — 97167 OT EVAL HIGH COMPLEX 60 MIN: CPT

## 2019-04-10 PROCEDURE — 85027 COMPLETE CBC AUTOMATED: CPT

## 2019-04-10 PROCEDURE — 1200000000 HC SEMI PRIVATE

## 2019-04-10 PROCEDURE — 71045 X-RAY EXAM CHEST 1 VIEW: CPT

## 2019-04-10 PROCEDURE — 6360000004 HC RX CONTRAST MEDICATION: Performed by: INTERNAL MEDICINE

## 2019-04-10 PROCEDURE — 97112 NEUROMUSCULAR REEDUCATION: CPT

## 2019-04-10 PROCEDURE — 84100 ASSAY OF PHOSPHORUS: CPT

## 2019-04-10 PROCEDURE — 97162 PT EVAL MOD COMPLEX 30 MIN: CPT

## 2019-04-10 RX ORDER — HYDROXYZINE HYDROCHLORIDE 25 MG/1
25 TABLET, FILM COATED ORAL 3 TIMES DAILY PRN
Status: DISCONTINUED | OUTPATIENT
Start: 2019-04-10 | End: 2019-04-13 | Stop reason: HOSPADM

## 2019-04-10 RX ORDER — HYDROXYZINE HYDROCHLORIDE 25 MG/1
50 TABLET, FILM COATED ORAL ONCE
Status: COMPLETED | OUTPATIENT
Start: 2019-04-10 | End: 2019-04-10

## 2019-04-10 RX ORDER — POTASSIUM CHLORIDE 1.5 G/1.77G
40 POWDER, FOR SOLUTION ORAL PRN
Status: DISCONTINUED | OUTPATIENT
Start: 2019-04-10 | End: 2019-04-13 | Stop reason: HOSPADM

## 2019-04-10 RX ORDER — POTASSIUM CHLORIDE 20 MEQ/1
40 TABLET, EXTENDED RELEASE ORAL PRN
Status: DISCONTINUED | OUTPATIENT
Start: 2019-04-10 | End: 2019-04-13 | Stop reason: HOSPADM

## 2019-04-10 RX ORDER — FLUCONAZOLE 100 MG/1
100 TABLET ORAL DAILY
Status: DISCONTINUED | OUTPATIENT
Start: 2019-04-11 | End: 2019-04-13 | Stop reason: HOSPADM

## 2019-04-10 RX ORDER — MAGNESIUM SULFATE 4 G/50ML
4 INJECTION INTRAVENOUS ONCE
Status: COMPLETED | OUTPATIENT
Start: 2019-04-10 | End: 2019-04-11

## 2019-04-10 RX ORDER — ACETAMINOPHEN 325 MG/1
650 TABLET ORAL EVERY 4 HOURS PRN
Status: DISCONTINUED | OUTPATIENT
Start: 2019-04-10 | End: 2019-04-13 | Stop reason: HOSPADM

## 2019-04-10 RX ORDER — THIAMINE HYDROCHLORIDE 100 MG/ML
500 INJECTION, SOLUTION INTRAMUSCULAR; INTRAVENOUS 3 TIMES DAILY
Status: DISCONTINUED | OUTPATIENT
Start: 2019-04-10 | End: 2019-04-10 | Stop reason: CLARIF

## 2019-04-10 RX ORDER — FLUCONAZOLE 100 MG/1
200 TABLET ORAL ONCE
Status: DISCONTINUED | OUTPATIENT
Start: 2019-04-10 | End: 2019-04-10

## 2019-04-10 RX ORDER — SODIUM CHLORIDE 0.9 % (FLUSH) 0.9 %
10 SYRINGE (ML) INJECTION
Status: DISCONTINUED | OUTPATIENT
Start: 2019-04-10 | End: 2019-04-13 | Stop reason: HOSPADM

## 2019-04-10 RX ORDER — FLUCONAZOLE 100 MG/1
200 TABLET ORAL ONCE
Status: COMPLETED | OUTPATIENT
Start: 2019-04-10 | End: 2019-04-10

## 2019-04-10 RX ORDER — POTASSIUM CHLORIDE 7.45 MG/ML
10 INJECTION INTRAVENOUS PRN
Status: DISCONTINUED | OUTPATIENT
Start: 2019-04-10 | End: 2019-04-13 | Stop reason: HOSPADM

## 2019-04-10 RX ADMIN — FLUCONAZOLE 200 MG: 100 TABLET ORAL at 14:57

## 2019-04-10 RX ADMIN — AMLODIPINE BESYLATE 5 MG: 5 TABLET ORAL at 01:09

## 2019-04-10 RX ADMIN — SODIUM CHLORIDE: 9 INJECTION, SOLUTION INTRAVENOUS at 01:09

## 2019-04-10 RX ADMIN — GABAPENTIN 300 MG: 300 CAPSULE ORAL at 01:09

## 2019-04-10 RX ADMIN — PANTOPRAZOLE SODIUM 40 MG: 40 TABLET, DELAYED RELEASE ORAL at 07:25

## 2019-04-10 RX ADMIN — GABAPENTIN 300 MG: 300 CAPSULE ORAL at 14:57

## 2019-04-10 RX ADMIN — NYSTATIN 500000 UNITS: 100000 SUSPENSION ORAL at 21:19

## 2019-04-10 RX ADMIN — SODIUM CHLORIDE: 9 INJECTION, SOLUTION INTRAVENOUS at 14:56

## 2019-04-10 RX ADMIN — NYSTATIN 500000 UNITS: 100000 SUSPENSION ORAL at 10:41

## 2019-04-10 RX ADMIN — HYDROXYZINE HYDROCHLORIDE 50 MG: 25 TABLET, FILM COATED ORAL at 12:45

## 2019-04-10 RX ADMIN — GABAPENTIN 300 MG: 300 CAPSULE ORAL at 22:31

## 2019-04-10 RX ADMIN — SERTRALINE HYDROCHLORIDE 25 MG: 50 TABLET ORAL at 12:45

## 2019-04-10 RX ADMIN — MAGNESIUM SULFATE HEPTAHYDRATE 4 G: 80 INJECTION, SOLUTION INTRAVENOUS at 23:58

## 2019-04-10 RX ADMIN — THIAMINE HYDROCHLORIDE 500 MG: 100 INJECTION, SOLUTION INTRAMUSCULAR; INTRAVENOUS at 23:23

## 2019-04-10 RX ADMIN — THIAMINE HYDROCHLORIDE 500 MG: 100 INJECTION, SOLUTION INTRAMUSCULAR; INTRAVENOUS at 14:54

## 2019-04-10 RX ADMIN — ACETAMINOPHEN 650 MG: 325 TABLET ORAL at 07:25

## 2019-04-10 RX ADMIN — NYSTATIN 500000 UNITS: 100000 SUSPENSION ORAL at 14:56

## 2019-04-10 RX ADMIN — TRAZODONE HYDROCHLORIDE 50 MG: 50 TABLET ORAL at 21:24

## 2019-04-10 RX ADMIN — GABAPENTIN 300 MG: 300 CAPSULE ORAL at 07:25

## 2019-04-10 RX ADMIN — TRAZODONE HYDROCHLORIDE 50 MG: 50 TABLET ORAL at 01:09

## 2019-04-10 RX ADMIN — SULFAMETHOXAZOLE AND TRIMETHOPRIM 160 MG: 80; 16 INJECTION, SOLUTION, CONCENTRATE INTRAVENOUS at 10:42

## 2019-04-10 RX ADMIN — POTASSIUM CHLORIDE 40 MEQ: 1.5 POWDER, FOR SOLUTION ORAL at 14:56

## 2019-04-10 RX ADMIN — NYSTATIN 500000 UNITS: 100000 SUSPENSION ORAL at 01:10

## 2019-04-10 RX ADMIN — SODIUM CHLORIDE, PRESERVATIVE FREE 10 ML: 5 INJECTION INTRAVENOUS at 01:10

## 2019-04-10 RX ADMIN — IOPAMIDOL 80 ML: 755 INJECTION, SOLUTION INTRAVENOUS at 20:55

## 2019-04-10 RX ADMIN — ENOXAPARIN SODIUM 40 MG: 40 INJECTION SUBCUTANEOUS at 10:41

## 2019-04-10 RX ADMIN — SULFAMETHOXAZOLE AND TRIMETHOPRIM 160 MG: 80; 16 INJECTION, SOLUTION, CONCENTRATE INTRAVENOUS at 22:42

## 2019-04-10 ASSESSMENT — PAIN DESCRIPTION - ORIENTATION
ORIENTATION: RIGHT;LEFT
ORIENTATION: RIGHT;LEFT

## 2019-04-10 ASSESSMENT — PAIN DESCRIPTION - LOCATION
LOCATION: BUTTOCKS
LOCATION: BACK

## 2019-04-10 ASSESSMENT — PAIN DESCRIPTION - FREQUENCY
FREQUENCY: CONTINUOUS
FREQUENCY: CONTINUOUS

## 2019-04-10 ASSESSMENT — PAIN SCALES - GENERAL
PAINLEVEL_OUTOF10: 0
PAINLEVEL_OUTOF10: 5
PAINLEVEL_OUTOF10: 7
PAINLEVEL_OUTOF10: 5

## 2019-04-10 ASSESSMENT — PAIN DESCRIPTION - PAIN TYPE
TYPE: CHRONIC PAIN
TYPE: CHRONIC PAIN

## 2019-04-10 ASSESSMENT — PAIN DESCRIPTION - ONSET: ONSET: ON-GOING

## 2019-04-10 ASSESSMENT — PAIN DESCRIPTION - DESCRIPTORS
DESCRIPTORS: BURNING
DESCRIPTORS: ACHING;CONSTANT

## 2019-04-10 NOTE — PROGRESS NOTES
Occupational Therapy   Occupational Therapy Initial Assessment  Date: 4/10/2019   Patient Name: Eusebio Rdoriguez  MRN: [id number]     : 1955    Date of Service: 4/10/2019    Discharge Recommendations:  IP Rehab  OT Equipment Recommendations  Other: defer    Assessment   Performance deficits / Impairments: Decreased functional mobility ; Decreased endurance;Decreased coordination;Decreased ADL status; Decreased safe awareness;Decreased high-level IADLs;Decreased cognition;Decreased balance  Treatment Diagnosis: UTI  Prognosis: Good  Decision Making: High Complexity  Assistance / Modification: Pt is a 61 yo female admitted from home for UTI. Pt at baseline is Independent for ADls, high level IADLs, functional mobility/transfers. Pt currently presents w/ above deficits and requires increased assistance for functional activities. Pt would benefit from continued acute care OT services w/ discharge to ARU. Patient Education: ot role, discharge rec, proper hand placement for safe functional transfers, proper body mechanics for bed mobility  Barriers to Learning: decreased cognition  REQUIRES OT FOLLOW UP: Yes  Activity Tolerance  Activity Tolerance: Patient Tolerated treatment well  Safety Devices  Safety Devices in place: Yes  Type of devices: All fall risk precautions in place;Nurse notified; Bed alarm in place;Gait belt;Call light within reach; Left in bed;Patient at risk for falls  Restraints  Initially in place: No           Patient Diagnosis(es): The primary encounter diagnosis was Generalized weakness. A diagnosis of Acute cystitis with hematuria was also pertinent to this visit. has a past medical history of Hyperlipidemia, Hypertension, and Obesity. has a past surgical history that includes back surgery.     Treatment Diagnosis: UTI      Restrictions  Restrictions/Precautions  Restrictions/Precautions: Fall Risk, General Precautions, Contact Precautions(ESBL)    Subjective   General  Chart Reviewed: food)  Grooming: Verbal cueing;Minimal assistance(washing face sitting EOB)  UE Bathing: Minimal assistance  LE Bathing: Maximum assistance(washing buttocks/petty area after pt soiled self x 2 supine in bed)  UE Dressing: Moderate assistance  LE Dressing: Maximum assistance(donning socks supine in bed and)  Toileting: Dependent/Total(kim catheter and soiling self x 2  requiring assistance for LE bathing)  Tone RUE  RUE Tone: Normotonic  Tone LUE  LUE Tone: Normotonic  Coordination  Movements Are Fluid And Coordinated: No  Coordination and Movement description: Tremors;Right UE;Left UE;Gross motor impairments;Decreased accuracy; Decreased speed     Bed mobility  Bridging: Moderate assistance  Rolling to Left: Moderate assistance  Rolling to Right: Moderate assistance  Supine to Sit: Maximum assistance  Sit to Supine: Maximum assistance  Scootin Person assistance(scooting to [facility])  Transfers  Sit to stand: Maximum assistance  Stand to sit: Maximum assistance     Cognition  Overall Cognitive Status: Exceptions  Arousal/Alertness: Inconsistent responses to stimuli  Following Commands:  Follows one step commands with repetition  Attention Span: Attends with cues to redirect  Memory: Decreased recall of biographical Information;Decreased recall of precautions;Decreased long term memory;Decreased short term memory;Decreased recall of recent events  Safety Judgement: Decreased awareness of need for assistance;Decreased awareness of need for safety  Problem Solving: Assistance required to generate solutions;Assistance required to correct errors made;Assistance required to identify errors made;Assistance required to implement solutions;Decreased awareness of errors  Insights: Not aware of deficits  Initiation: Requires cues for all  Sequencing: Requires cues for all  Perception  Overall Perceptual Status: Impaired  Motor Planning: Hand over hand to sequence tasks     Sensation  Overall Sensation Status: WFL        LUE AROM (degrees)  LUE AROM : WFL  RUE AROM (degrees)  RUE AROM : WFL  LUE Strength  Gross LUE Strength: WFL  L Hand Grasp: 4+/5  RUE Strength  Gross RUE Strength: WFL  R Hand Grasp: 4+/5           Self Care Training:   Cues were given for safety, sequence, UE/LE placement, visual cues, and balance.     Activities performed today included LE dressing, LE bathing, toileting, grooming          Plan   Plan  Times per week: 2x+  Times per day: Daily  Current Treatment Recommendations: Endurance Training, Patient/Caregiver Education & Training, Positioning, Cognitive Reorientation, Equipment Evaluation, Education, & procurement, Balance Training, Gait Training, Pain Management, Self-Care / ADL, Functional Mobility Training, Stair training, Safety Education & Training, Home Management Training, Cognitive/Perceptual Training             AM-PAC Score        AM-[facility] Inpatient Daily Activity Raw Score: 11  AM-PAC Inpatient ADL T-Scale Score : 29.04  ADL Inpatient CMS 0-100% Score: 70.42  ADL Inpatient CMS G-Code Modifier : CL    Goals  Short term goals  Time Frame for Short term goals: discharge  Short term goal 1: Pt will perform UE bathing/dressing Supervision  Short term goal 2: Pt will perform LE bathing/dressing Supervision  Short term goal 3: Pt will perform functional transfer w/ AD Supervision  Short term goal 4: Pt will perform functional mobility to/from bathroom w/ AD Supervision  Short term goal 5: Pt will perform toileting Supervision  Patient Goals   Patient goals : go home     Time In: 1346  Time Out: 1414  Total Treatment Minutes: 23 minutes  Total Treatment Time: 28 minutes    Rachel RIVAS/L [id number]  3:06 PM,4/10/2019

## 2019-04-10 NOTE — CONSULTS
[address] Continence Nurse  Consult Note       Shireen Hopkins  AGE: 62 y.o. GENDER: female  : 1955  TODAY'S DATE:  4/10/2019    Subjective:     Reason for [facility] Evaluation and Assessment: wound consult      Shireen Hopkins is a 62 y.o. female referred by:   [x] Physician  [] Nursing  [] Other:     Wound Identification:  Wound Type: no open wounds or Pressure Injuries  Contributing Factors: chronic pressure and decreased mobility        PAST MEDICAL HISTORY        Diagnosis Date    Hyperlipidemia     Hypertension     Obesity        PAST SURGICAL HISTORY    Past Surgical History:   Procedure Laterality Date    BACK SURGERY         FAMILY HISTORY    Family History   Problem Relation Age of Onset    Diabetes Mother     Cancer Father     Other Sister        SOCIAL HISTORY    Social History     Tobacco Use    Smoking status: Current Every Day Smoker     Packs/day: 0.50     Years: 10.00     Pack years: 5.00     Types: Cigarettes    Smokeless tobacco: Never Used    Tobacco comment: quit since shes been sick   Substance Use Topics    Alcohol use: Yes     Comment: 2-4 times a week    Drug use: No       ALLERGIES    Allergies   Allergen Reactions    Amoxicillin-Pot Clavulanate Rash       MEDICATIONS    No current facility-administered medications on file prior to encounter. Current Outpatient Medications on File Prior to Encounter   Medication Sig Dispense Refill    nystatin (MYCOSTATIN) 645552 UNIT/ML suspension Take 5 mLs by mouth 4 times daily Swish and retain in mouth as long as possible-use 2 days after symptoms resolve 1 Bottle 0    fluconazole (DIFLUCAN) 100 MG tablet Take 1 tablet by mouth daily for 7 days 7 tablet 0    traZODone (DESYREL) 50 MG tablet TAKE ONE (1) TABLET BY MOUTH AT BEDTIME AS NEEDED FOR SLEEP 30 tablet 5    gabapentin (NEURONTIN) 300 MG capsule Take 1 capsule by mouth 3 times daily for 30 days. . (Patient taking differently: Take 900 mg by mouth nightly. ) 90 capsule 5    amLODIPine (NORVASC) 5 MG tablet Take 1 tablet by mouth daily 30 tablet 3    omeprazole (PRILOSEC) 40 MG delayed release capsule Take 1 capsule by mouth daily 30 capsule 2    sertraline (ZOLOFT) 25 MG tablet Take 1 tablet by mouth daily 30 tablet 3    vitamin B-1 100 MG tablet Take 1 tablet by mouth daily 30 tablet 3         Objective:      BP (!) 113/59   Pulse 94   Temp 98.5 °F (36.9 °C) (Oral)   Resp 20   Ht 5' (1.524 m)   Wt 151 lb (68.5 kg)   LMP 02/07/2012   SpO2 98%   BMI 29.49 kg/m²   Antonio Risk Score: Antonio Scale Score: 13    LABS    CBC:   Lab Results   Component Value Date    WBC 10.5 04/10/2019    RBC 3.11 04/10/2019    HGB 9.6 04/10/2019    HCT 31.5 04/10/2019    .3 04/10/2019    MCH 30.9 04/10/2019    MCHC 30.5 04/10/2019    RDW 14.4 04/10/2019     04/10/2019    MPV 10.0 04/10/2019     CMP:    Lab Results   Component Value Date     04/10/2019    K 3.2 04/10/2019     04/10/2019    CO2 27 04/10/2019    BUN 15 04/10/2019    CREATININE 0.7 04/10/2019    GFRAA >60 04/10/2019    AGRATIO 1.5 10/25/2018    LABGLOM >60 04/10/2019    GLUCOSE 119 04/10/2019    PROT 6.4 04/09/2019    LABALBU 3.1 04/09/2019    CALCIUM 7.7 04/10/2019    BILITOT 0.5 04/09/2019    ALKPHOS 71 04/09/2019    AST 15 04/09/2019    ALT 5 04/09/2019     Albumin:    Lab Results   Component Value Date    LABALBU 3.1 04/09/2019     PT/INR:    Lab Results   Component Value Date    PROTIME 12.2 09/24/2018    INR 1.07 09/24/2018     HgBA1c:    Lab Results   Component Value Date    LABA1C 5.5 01/24/2014         Assessment:     Patient Active Problem List   Diagnosis    Tobacco use disorder    Dysphagia    Fatigue    Anemia    Complicated UTI (urinary tract infection)    Pneumonia    Encephalopathy acute    Essential hypertension    UTI (urinary tract infection)    Severe malnutrition (HCC)       Measurements:       Response to treatment:  Well tolerated by patient.      Pain Assessment:  Severity:  denies  Quality of pain: na  Wound Pain Timing/Severity: na  Premedicated: no    Plan:     Plan of Care:  Wound Team eval reveals no open wounds or pressure injuries at this time. Skin intact to heels and buttocks. Perineal area is intact with pinkish discoloration that is present on buttocks and extends to perineum. Report given to RN. Specialty Bed Required : atmos air placed  [] Low Air Loss   [x] Pressure Redistribution  [] Fluid Immersion  [] Bariatric  [] Total Pressure Relief  [] Other:     Discharge Plan:  Placement for patient upon discharge: home  Hospice Care: no  Patient appropriate for Outpatient [address]: no    Patient/Caregiver Teaching:  Level of patient/caregiver understanding able to:   Pt cooperative with care.        Electronically signed by Ana James RN, [facility] on 4/10/2019 at 12:00 PM

## 2019-04-10 NOTE — PLAN OF CARE
Problem: Falls - Risk of  Goal: Will remain free from falls  Description  Will remain free from falls     Outcome: Ongoing     Problem: Skin Integrity - Risk of, Impaired  Goal: Risk for impaired skin integrity will decrease  Description  Risk for impaired skin integrity will decrease     Outcome: Ongoing

## 2019-04-10 NOTE — CONSULTS
[facility] [address]   Consult Note  [facility] 1 2 3 4 5    Date: 4/10/2019   Patient: Judie Driver   : 1955   DOA: 2019   MRN: [id number]   ROOM#: 1106/1106-A     Reason for Consult: Urinary Incontinence   Requesting Physician:  Dr. Cecilia Gleason: gernealized Weyman Hinson and fall    History Obtained From:  patient, electronic medical record    HISTORY OF PRESENT ILLNESS:                The patient is a 62 y.o. female with significant past medical history of HLD, HTN, obesity who presented with gernealized weaness and fall. According to ED notes pt was found down at home by daughter with confusion and hallucinations.          Past Medical History:        Diagnosis Date    Hyperlipidemia     Hypertension     Obesity      Past Surgical History:        Procedure Laterality Date    BACK SURGERY       Current Medications:   Current Facility-Administered Medications: acetaminophen (TYLENOL) tablet 650 mg, 650 mg, Oral, Q4H PRN  potassium chloride (KLOR-CON M) extended release tablet 40 mEq, 40 mEq, Oral, PRN **OR** potassium chloride (KLOR-CON) packet 40 mEq, 40 mEq, Oral, PRN **OR** potassium chloride 10 mEq/100 mL IVPB (Peripheral Line), 10 mEq, Intravenous, PRN  sulfamethoxazole-trimethoprim (BACTRIM) 160 mg in dextrose 5 % 500 mL IVPB, 160 mg, Intravenous, Q12H  [START ON 2019] fluconazole (DIFLUCAN) tablet 100 mg, 100 mg, Oral, Daily  hydrOXYzine (ATARAX) tablet 25 mg, 25 mg, Oral, TID PRN  fluconazole (DIFLUCAN) tablet 200 mg, 200 mg, Oral, Once  thiamine (B-1) 500 mg in sodium chloride 0.9 % 100 mL IVPB, 500 mg, Intravenous, TID  folic acid (FOLVITE) tablet 1 mg, 1 mg, Oral, Daily  therapeutic multivitamin-minerals 1 tablet, 1 tablet, Oral, Daily  pantoprazole (PROTONIX) tablet 40 mg, 40 mg, Oral, QAM AC  sertraline (ZOLOFT) tablet 25 mg, 25 mg, Oral, Daily  traZODone (DESYREL) tablet 50 mg, 50 mg, Oral, Nightly PRN  sodium chloride flush 0.9 % injection 10 mL, 10 mL, Intravenous, 2 times per day  sodium chloride flush 0.9 % injection 10 mL, 10 mL, Intravenous, PRN  magnesium hydroxide (MILK OF MAGNESIA) 400 MG/5ML suspension 30 mL, 30 mL, Oral, Daily PRN  ondansetron (ZOFRAN) injection 4 mg, 4 mg, Intravenous, Q6H PRN  enoxaparin (LOVENOX) injection 40 mg, 40 mg, Subcutaneous, Daily  0.9 % sodium chloride infusion, , Intravenous, Continuous  amLODIPine (NORVASC) tablet 5 mg, 5 mg, Oral, Daily  gabapentin (NEURONTIN) capsule 300 mg, 300 mg, Oral, TID  nystatin (MYCOSTATIN) 217677 UNIT/ML suspension 500,000 Units, 500,000 Units, Oral, 4x Daily  0.9 % sodium chloride infusion, , Intravenous, Continuous    Allergies:  Amoxicillin-pot clavulanate    Social History:   TOBACCO:   reports that she has been smoking cigarettes. She has a 5.00 pack-year smoking history. She has never used smokeless tobacco.  ETOH:   reports that she drinks alcohol. DRUGS:   reports that she does not use drugs. Family History:       Problem Relation Age of Onset    Diabetes Mother     Cancer Father     Other Sister        REVIEW OF SYSTEMS:     CONSTITUTIONAL:  negative  EYES:  negative  RESPIRATORY:  negative  GENITOURINARY:  positive for urinary incontinence  NEUROLOGICAL:  positive for confusion at times per nursing report   BEHAVIOR/PSYCH:  negative      PHYSICAL EXAM:      VITALS:  BP (!) 113/59   Pulse 94   Temp 98.5 °F (36.9 °C) (Oral)   Resp 20   Ht 5' (1.524 m)   Wt 151 lb (68.5 kg)   LMP 2012   SpO2 98%   BMI 29.49 kg/m²      TEMPERATURE:  Current - Temp: 98.5 °F (36.9 °C);  Max - Temp  Av.2 °F (36.8 °C)  Min: 98.1 °F (36.7 °C)  Max: 98.5 °F (36.9 °C)  24HR BLOOD PRESSURE RANGE:  Systolic (59VHU), EEB:473 , Min:104 , TLF:603   ; Diastolic (69QAA), VYY:26, Min:11, Max:100    General appearance: alert, appears stated age, cooperative and no distress  Head: Normocephalic, without obvious abnormality, atraumatic  Back: denies flank pain  Abdomen: soft, non-tender  Neurologic: Grossly normal  : Per kim catheter Clear Dark Yellow urine     DATA:    WBC:    Lab Results   Component Value Date    WBC 10.5 04/10/2019     Hemoglobin/Hematocrit:    Lab Results   Component Value Date    HGB 9.6 04/10/2019    HCT 31.5 04/10/2019     BMP:    Lab Results   Component Value Date     04/10/2019    K 3.2 04/10/2019     04/10/2019    CO2 27 04/10/2019    BUN 15 04/10/2019    LABALBU 3.1 04/09/2019    CREATININE 0.7 04/10/2019    CALCIUM 7.7 04/10/2019    GFRAA >60 04/10/2019    LABGLOM >60 04/10/2019     PT/INR:    Lab Results   Component Value Date    PROTIME 12.2 09/24/2018    INR 1.07 09/24/2018     Blood Culture: pending  Urine Culture: pending    Imaging:  CT Urogram pending     Assessment & Plan:      Lillian Castro is a 62y.o. year old female admitted 4/9/2019 for gernealized weaness and fall    1) Urinary Incontinence:    Pt reports this has been occurring for around 5 months when she coughs, laughs or sneezes. Will hold off on treatment until UTI is treated and pt cognition improves   CT Urogram pending      2) UTI:    Pt was having confusion with hallucinations per daughter report in ED    CR 0.7   WBC 10.5   UA Large Leuk, Rare bacteria, ,    Pt on Bactrim and rocephin    Urine culture pending       Patient seen and examined, chart reviewed.      Electronically signed by FRANKLIN Celeste CNP on 4/10/2019 at 1:14 PM

## 2019-04-10 NOTE — PROGRESS NOTES
Nutrition Assessment    Type and Reason for Visit: Initial, Positive Nutrition Screen(poor intake/appetite, swallowing difficulty with food )    Nutrition Recommendations: Continue general diet as tolerates    Will offer high calorie ensure during stay   Encourage intake, assist with meals as needed    Nutrition Assessment: Patient currently high nutrition risk with severe malnutrition in context of acute illness related to poor appetite with swallowing difficulty as evidenced by diet hx poor intake and weight loss. Willing to drink ensure, was trying to drink supplement at home also. Very limited intake recently, trying to eat soft foods and liquids. Malnutrition Assessment:  · Malnutrition Status: Meets the criteria for severe malnutrition  · Context: Acute illness or injury  · Findings of the 6 clinical characteristics of malnutrition (Minimum of 2 out of 6 clinical characteristics is required to make the diagnosis of moderate or severe Protein Calorie Malnutrition based on AND/ASPEN Guidelines):  1. Energy Intake-Less than or equal to 50% of estimated energy requirement, Greater than or equal to 7 days    2. Weight Loss-10% loss or greater, in 3 months  3.  Fat Loss-Mild subcutaneous fat loss, Orbital    Nutrition Risk Level: High    Nutrient Needs:  · Estimated Daily Total Kcal: 9526-5138 (20-25 jim/kg current weigh)  · Estimated Daily Protein (g): 58-63 (1.3-1.4 g/kg IBW)   · Estimated Daily Total Fluid (ml/day): 1700 (1 ml/jim)    Nutrition Diagnosis:   · Problem: Severe malnutrition, In context of acute illness or injury  · Etiology: related to Other (Comment), Difficulty swallowing(poor appetite )     Signs and symptoms:  as evidenced by Diet history of poor intake, Weight loss greater than or equal to 7.5% in 3 months    Objective Information:  · Nutrition-Focused Physical Findings: curled up in bed with family in room   · Wound Type: None  · Current Nutrition Therapies:  · Oral Diet Orders: General   · Oral Diet intake: Unable to assess  · Oral Nutrition Supplement (ONS) Orders: None  · ONS intake:    · Anthropometric Measures:  · Ht: 5' (152.4 cm)   · Current Body Wt: 151 lb (68.5 kg)  · % Weight Change:  ,  progressive weight loss 60# in 6 months, recent loss more than 10% in past 3 months   · Ideal Body Wt: 100 lb (45.4 kg), % Ideal Body 151  · BMI Classification: BMI 25.0 - 29.9 Overweight(BMI-29. 6)    Nutrition Interventions:   Continue current diet, Start ONS  Feeding Assistance/Environment Change, Continued Inpatient Monitoring, Education not appropriate at this time    Nutrition Evaluation:   · Evaluation: Goals set   · Goals: Patient will tolerate diet to consume at least 50-75% at meals during stay    · Monitoring: Meal Intake, Supplement Intake, Weight, Pertinent Labs, Chewing/Swallowing, Diet Tolerance      Electronically signed by Yaw Ny, MARIANO, LD on 4/10/19 at 11:54 AM    Contact Number: [phone]

## 2019-04-10 NOTE — PROGRESS NOTES
HOSPITALIST      Spoke with daughter who stated patient was drinking \"a lot\" up until 2 weeks ago - she would drink with her neighbors. Daughter also indicates she has \"not slept in 2 weeks\" and is restless today. Will give high dose IV thiamine.

## 2019-04-10 NOTE — PROGRESS NOTES
Physical Therapy  [facility] ACUTE CARE PHYSICAL THERAPY EVALUATION  Nathalie Noble, 1955, 1106/1106-A, 4/10/2019    History  [place] IRA:  The primary encounter diagnosis was Generalized weakness. A diagnosis of Acute cystitis with hematuria was also pertinent to this visit. Patient  has a past medical history of Hyperlipidemia, Hypertension, and Obesity. Patient  has a past surgical history that includes back surgery. Subjective:  Patient states:  Patient confused, amenable to therapy. Pain:  Didn't report pain. Communication with other providers:  Handoff to RN, co-eval with OT. Restrictions: High fall risk    Home Setup/Prior level of function  Social/Functional History  Lives With: Alone  Type of Home: Apartment  Home Layout: One level  Home Access: Elevator  Bathroom Shower/Tub: Tub/Shower unit (no tub chair)  Bathroom Equipment: Grab bars in shower (call bell )  Home Equipment:  (none)  Receives Help From: Family (children)  ADL Assistance: Independent  Homemaking Assistance: Independent  Ambulation Assistance: Independent  Transfer Assistance: Independent  Active : No  Patient's  Info: family provides transportation  Leisure & Hobbies: spending time with [employer]   Additional Comments: recent fall coming into hospital 2* dizziness. Daughter reported that pt will have 25 7 care provided by children upon discharge  *from previous admission, 6 months ago    Examination of body systems (includes body structures/functions, activity/participation limitations):  · Observation:  Supine in bed upon arrival   · Vision:  Unable to assess  · Hearing:  [facility]  · Cardiopulmonary:  No O2  · Cognition: impaired, see OT/SLP note for further evaluation. Musculoskeletal  · ROM R/L:  WFL. · Strength R/L:  4/5, weak in function and endurance. · Neuro: tremors noted, patient reporting present at baseline. Mobility:  · Rolling L/R:  CGA for safety  · Supine to sit:   Max A  · Transfers: Mod A for STS  · Sitting balance:  Varied min A to max A.    · Standing balance: Mod A.    · Gait: unsafe at this time    [facility] 6 Clicks Inpatient Mobility:  AM-PAC Inpatient Mobility Raw Score : 10    Treatment:  Cues for supine to sit transfer, patient able to move LE but uncoordinated and difficulty coordinating transfer and following commands. Max A at trunk as patient highly retropulsive. Performed sitting balance at EOB x10 min, repeated cues for UE/LE positioning and cues for midline oreintation, varying assist at trunk from max to min A for upright posture. Patient laying down backward multiple times, despite cueing, attempted to educate patient on safe positioning and that retropulsive behavior was a higher fall risk d/t hips sliding forward. Able to perform STS after multiple attempts with max cueing for safety with mod A to stand and then mod to max A for standing balance as patient retropulsive, with extreme trunk extension by end of standing trial, intermittently following commands for anterior weight shift. Patient with knee buckling and uncontrolled impulsive movements throughout session. Rolling R and L in bed for hygiene as patient had x2 BM requiring linen change, CGA to prevent patient from rolling off bed. Patient impulsive throughout session and d/t confusion demonstrated difficulty following commands correctly. Safety: patient left in chair with chair alarm, call light within reach, RN notified, gait belt used. Assessment:  Patient is a 61 yo female who presents with weakness and multiple falls with possible UTI. Patient poor historian and unable to provide home information, gathered from last admission 6 months ago. Patient demonstrates significantly impaired mobility and cognition and was mod I at previous admission 6 months ago. Patient would benefit from skilled PT services and d/c to ARU.   Complexity: Moderate  Prognosis: Good, no significant barriers to participation at this time. Plan Times per week: 3/week, 1 week,   Discharge Recommendations: IP Rehab  Equipment: TBD    Goals:  Short term goals  Time Frame for Short term goals: 1 week  Short term goal 1: Patient will perform supine to sit transfer with min A. Short term goal 2: Patient will perform STS with min A. Short term goal 3: Patient will pivot to chair with min A. Treatment plan:  Bed mobility, transfers, balance, TA, TX. Hold gait training until cognition improves. Recommendations for NURSING mobility: do not mobilize OOB at this time.      Time:   Time in: 1346  Time out: 1414  Timed treatment minutes: 23  Total time: 28    Electronically signed by:    Tamiko Kessler, PT  4/10/2019, 3:21 PM

## 2019-04-10 NOTE — CARE COORDINATION
9:15 - WB note placed asking for PT/OT. 1:45 - message from the CN. She spoke with Pt. Pt is from home alone. Pt had passport aide services but Pt stopped answering the door and they terminated their services. CN called Pt dgtr. Ashley Garcia [phone]. Pt has been confused. They would like for Pt to go to rehab. They would like [facility] ( [facility]), [facility] or [facility]. LSW will wait for PT/OT evals and rec's.

## 2019-04-10 NOTE — PLAN OF CARE
Nutrition Problem: Severe malnutrition, In context of acute illness or injury  Intervention: Food and/or Nutrient Delivery: Continue current diet, Start ONS  Nutritional Goals: Patient will tolerate diet to consume at least 50-75% at meals during stay

## 2019-04-10 NOTE — CARE COORDINATION
[address] Transitions Interview     4/10/2019    Patient: Bobby Trinidad Patient : 1955   MRN: [id number]  Reason for Admission:   RARS: Readmission Risk Score: 13         Spoke with:   Patient/  Patient's daughter      Readmission Risk  Patient Active Problem List   Diagnosis    Tobacco use disorder    Dysphagia    Fatigue    Anemia    Complicated UTI (urinary tract infection)    Pneumonia    Encephalopathy acute    Essential hypertension    UTI (urinary tract infection)    Severe malnutrition [facility])       Inpatient Assessment  Care Transitions Summary    Care Transitions Inpatient Review  Medication Review  Are you able to afford your medications?:  Yes  How often do you have difficulty taking your medications?:  I always take them as prescribed. Housing Review  Who do you live with?:  Alone  Are you an active caregiver in your home?:  No  Social Support  Do you have a ?:  No  Do you have a [address] [place] [address]?:  No  Durable Medical Equipment  Patient DME:  Gay Marsh  Functional Review  Ability to seek help/take action for Emergent/Urgent situations i.e. fire, crime, inclement weather or health crisis.:  Needs Assistance  Ability handle personal hygiene needs (bathing/dressing/grooming):  Needs Assistance  Ability to manage medications:  Needs Assistance  Ability to prepare food:  Needs Assistance  Ability to maintain home (clean home, laundry):  Needs Assistance  Ability to drive and/or has transportation:  Needs Assistance  Ability to do shopping:  Needs Assistance  Ability to manage finances:  Needs Assistance  Is patient able to live independently?:  No  Hearing and Vision  Visual Impairment:  None  Hearing Impairment:  None  Care Transitions Interventions         Follow Up:  Spoke with patient. Oriented to the role of Care Transition with business card given. Patient reports that she came into the facility after a fall.   Reports that prior to admission she lived at home. Reports that she believes that she has Passport and her daughters assist with her care needs. Patient is confused at times and reports that her memory is \"not good\". Requests that CTC contact her daughter for further information. Confirmed CTC contact information. Encouraged call back if needs arise. Spoke with patient's daughter:  Nate Andino [phone]. Reports that prior to admission patient lived alone in an apartment. Confirms that patient has a PCP and that her daughter assists with transportation to appointments. Patient's daughter reports that patient was active with [employer]; but her services were cancelled as she was \"too weak to come to the door\". Discussed [facility] services and patient's daughter reports that she works at night and is unable to provide 24 hour care. Voiced concern in r/t patient safety at home as she reports that patient \"could barely walk\" and is growing \"more confused\". Patient's daughter reports that she feels that patient would benefit from short term skilled nursing placement for PT/OT. Her long term plan is to move patient in with her ; but she does not feel that patient is strong enough and she would like to make sure that patient has the equipment she needs to safely ambulate at home. Patient's daughter's preference is SNF placement. Requesting [facility] [place] or [address] [place] [address] as these facilities are located close to patient's home. Informed of CTC plan to update CM in r/t above plan. Confirmed CTC contact information. Encouraged call back if needs arise. Message left for CM to collaborate in r/t above. Future Appointments   Date Time Provider Dontae Cortes   6/6/2019  2:45 PM Pawan Boland MD [facility]  There are no preventive care reminders to display for this patient.     Brayden Garza RN

## 2019-04-11 LAB
ANION GAP SERPL CALCULATED.3IONS-SCNC: 13 MMOL/L (ref 4–16)
BUN BLDV-MCNC: 10 MG/DL (ref 6–23)
CALCIUM SERPL-MCNC: 7.4 MG/DL (ref 8.3–10.6)
CHLORIDE BLD-SCNC: 107 MMOL/L (ref 99–110)
CO2: 17 MMOL/L (ref 21–32)
CREAT SERPL-MCNC: 0.8 MG/DL (ref 0.6–1.1)
GFR AFRICAN AMERICAN: >60 ML/MIN/1.73M2
GFR NON-AFRICAN AMERICAN: >60 ML/MIN/1.73M2
GLUCOSE BLD-MCNC: 110 MG/DL (ref 70–99)
HCT VFR BLD CALC: 33.8 % (ref 37–47)
HEMOGLOBIN: 9.9 GM/DL (ref 12.5–16)
MAGNESIUM: 2.3 MG/DL (ref 1.8–2.4)
MCH RBC QN AUTO: 31.2 PG (ref 27–31)
MCHC RBC AUTO-ENTMCNC: 29.3 % (ref 32–36)
MCV RBC AUTO: 106.6 FL (ref 78–100)
PDW BLD-RTO: 14.5 % (ref 11.7–14.9)
PHOSPHORUS: 2.2 MG/DL (ref 2.5–4.9)
PLATELET # BLD: 277 K/CU MM (ref 140–440)
PMV BLD AUTO: 9.5 FL (ref 7.5–11.1)
POTASSIUM SERPL-SCNC: 3.7 MMOL/L (ref 3.5–5.1)
RBC # BLD: 3.17 M/CU MM (ref 4.2–5.4)
SODIUM BLD-SCNC: 137 MMOL/L (ref 135–145)
TSH HIGH SENSITIVITY: 0.77 UIU/ML (ref 0.27–4.2)
WBC # BLD: 8.4 K/CU MM (ref 4–10.5)

## 2019-04-11 PROCEDURE — 2580000003 HC RX 258: Performed by: INTERNAL MEDICINE

## 2019-04-11 PROCEDURE — 84443 ASSAY THYROID STIM HORMONE: CPT

## 2019-04-11 PROCEDURE — 2500000003 HC RX 250 WO HCPCS: Performed by: INTERNAL MEDICINE

## 2019-04-11 PROCEDURE — 80048 BASIC METABOLIC PNL TOTAL CA: CPT

## 2019-04-11 PROCEDURE — 6360000002 HC RX W HCPCS: Performed by: INTERNAL MEDICINE

## 2019-04-11 PROCEDURE — 6370000000 HC RX 637 (ALT 250 FOR IP): Performed by: FAMILY MEDICINE

## 2019-04-11 PROCEDURE — 92526 ORAL FUNCTION THERAPY: CPT

## 2019-04-11 PROCEDURE — 6370000000 HC RX 637 (ALT 250 FOR IP): Performed by: INTERNAL MEDICINE

## 2019-04-11 PROCEDURE — 36415 COLL VENOUS BLD VENIPUNCTURE: CPT

## 2019-04-11 PROCEDURE — 83735 ASSAY OF MAGNESIUM: CPT

## 2019-04-11 PROCEDURE — 1200000000 HC SEMI PRIVATE

## 2019-04-11 PROCEDURE — 99223 1ST HOSP IP/OBS HIGH 75: CPT | Performed by: PSYCHIATRY & NEUROLOGY

## 2019-04-11 PROCEDURE — 84100 ASSAY OF PHOSPHORUS: CPT

## 2019-04-11 PROCEDURE — 85027 COMPLETE CBC AUTOMATED: CPT

## 2019-04-11 PROCEDURE — 94761 N-INVAS EAR/PLS OXIMETRY MLT: CPT

## 2019-04-11 RX ORDER — LANOLIN ALCOHOL/MO/W.PET/CERES
3 CREAM (GRAM) TOPICAL ONCE
Status: COMPLETED | OUTPATIENT
Start: 2019-04-11 | End: 2019-04-11

## 2019-04-11 RX ORDER — LANOLIN ALCOHOL/MO/W.PET/CERES
3 CREAM (GRAM) TOPICAL NIGHTLY PRN
Status: DISCONTINUED | OUTPATIENT
Start: 2019-04-11 | End: 2019-04-13 | Stop reason: HOSPADM

## 2019-04-11 RX ADMIN — SULFAMETHOXAZOLE AND TRIMETHOPRIM 160 MG: 80; 16 INJECTION, SOLUTION, CONCENTRATE INTRAVENOUS at 22:47

## 2019-04-11 RX ADMIN — THIAMINE HYDROCHLORIDE 500 MG: 100 INJECTION, SOLUTION INTRAMUSCULAR; INTRAVENOUS at 16:54

## 2019-04-11 RX ADMIN — FOLIC ACID 1 MG: 1 TABLET ORAL at 10:31

## 2019-04-11 RX ADMIN — NYSTATIN 500000 UNITS: 100000 SUSPENSION ORAL at 21:32

## 2019-04-11 RX ADMIN — HYDROXYZINE HYDROCHLORIDE 25 MG: 25 TABLET, FILM COATED ORAL at 22:47

## 2019-04-11 RX ADMIN — SERTRALINE HYDROCHLORIDE 25 MG: 50 TABLET ORAL at 10:31

## 2019-04-11 RX ADMIN — SULFAMETHOXAZOLE AND TRIMETHOPRIM 160 MG: 80; 16 INJECTION, SOLUTION, CONCENTRATE INTRAVENOUS at 12:43

## 2019-04-11 RX ADMIN — ENOXAPARIN SODIUM 40 MG: 40 INJECTION SUBCUTANEOUS at 10:32

## 2019-04-11 RX ADMIN — MELATONIN TAB 3 MG 3 MG: 3 TAB at 07:04

## 2019-04-11 RX ADMIN — SODIUM CHLORIDE: 9 INJECTION, SOLUTION INTRAVENOUS at 11:56

## 2019-04-11 RX ADMIN — MULTIPLE VITAMINS W/ MINERALS TAB 1 TABLET: TAB at 10:31

## 2019-04-11 RX ADMIN — GABAPENTIN 300 MG: 300 CAPSULE ORAL at 05:54

## 2019-04-11 RX ADMIN — THIAMINE HYDROCHLORIDE 500 MG: 100 INJECTION, SOLUTION INTRAMUSCULAR; INTRAVENOUS at 11:44

## 2019-04-11 RX ADMIN — ACETAMINOPHEN 650 MG: 325 TABLET ORAL at 16:54

## 2019-04-11 RX ADMIN — NYSTATIN 500000 UNITS: 100000 SUSPENSION ORAL at 13:49

## 2019-04-11 RX ADMIN — TRAZODONE HYDROCHLORIDE 50 MG: 50 TABLET ORAL at 21:32

## 2019-04-11 RX ADMIN — GABAPENTIN 300 MG: 300 CAPSULE ORAL at 21:31

## 2019-04-11 RX ADMIN — SODIUM CHLORIDE, PRESERVATIVE FREE 10 ML: 5 INJECTION INTRAVENOUS at 11:45

## 2019-04-11 RX ADMIN — SODIUM PHOSPHATE, MONOBASIC, MONOHYDRATE 15 MMOL: 276; 142 INJECTION, SOLUTION INTRAVENOUS at 11:44

## 2019-04-11 RX ADMIN — FLUCONAZOLE 100 MG: 100 TABLET ORAL at 10:32

## 2019-04-11 RX ADMIN — NYSTATIN 500000 UNITS: 100000 SUSPENSION ORAL at 16:54

## 2019-04-11 RX ADMIN — AMLODIPINE BESYLATE 5 MG: 5 TABLET ORAL at 10:33

## 2019-04-11 RX ADMIN — GABAPENTIN 300 MG: 300 CAPSULE ORAL at 13:49

## 2019-04-11 ASSESSMENT — PAIN SCALES - GENERAL
PAINLEVEL_OUTOF10: 5
PAINLEVEL_OUTOF10: 3

## 2019-04-11 NOTE — DISCHARGE INSTR - COC
Continuity of Care Form    Patient Name: Janie Falcon   :  1955  MRN:  [id number]    Admit date:  2019  Discharge date:  ***    Code Status Order: Full Code   Advance Directives:   [facility] Documentation     Date/Time Healthcare Directive Type of Healthcare Directive Copy in [address] Agent's Name Healthcare Agent's Phone Number    04/10/19 0138  No, patient does not have an advance directive for healthcare treatment -- -- -- -- --          Admitting Physician:  Richardson Nye MD  PCP: Timothy Potter MD    Discharging Nurse: [facility]/Room#: 1106/1106-A  Discharging Unit Phone Number: ***    Emergency Contact:   Extended Emergency Contact Information  Primary Emergency Contact: Ana Paula Chen  Address: [address] [place] [address], [address] [place] [address] Phone: [phone]  Work Phone: [phone]  Relation: Child    Past Surgical History:  Past Surgical History:   Procedure Laterality Date    BACK SURGERY         Immunization History:   Immunization History   Administered Date(s) Administered    Influenza, Quadv, 3 yrs and older, IM, PF (Fluzone 3 yrs and older or Afluria 5 yrs and older) 10/25/2018    Pneumococcal Polysaccharide ([id number]) 10/25/2018       Active Problems:  Patient Active Problem List   Diagnosis Code    Tobacco use disorder F17.200    Dysphagia R13.10    Fatigue R53.83    Anemia E24.7    Complicated UTI (urinary tract infection) N39.0    Pneumonia J18.9    Encephalopathy acute G93.40    Essential hypertension I10    UTI (urinary tract infection) N39.0    Severe malnutrition ([address] E43       Isolation/Infection:   Isolation          Contact        Patient Infection Status     Infection Encounter Level?  Onset Date Added Added By Resolved Resolved By Review Date    ESBL (Extended Spectrum Beta Lactamase) No  18 Arun Baldwin RN       18 E coli ESBL urine Nurse Assessment:  Last Vital Signs: /63   Pulse 108   Temp 98.1 °F (36.7 °C) (Oral)   Resp 16   Ht 5' (1.524 m)   Wt 173 lb (78.5 kg)   LMP 02/07/2012   SpO2 98%   BMI 33.79 kg/m²     Last documented pain score (0-10 scale): Pain Level: 5  Last Weight:   Wt Readings from Last 1 Encounters:   04/11/19 173 lb (78.5 kg)     Mental Status:  {IP PT MENTAL STATUS:20030}    IV Access:  { LOUIE IV ACCESS:322846730}    Nursing Mobility/ADLs:  Walking   {P DME AYRH:825702131}  Transfer  {P DME DAFN:811158093}  Bathing  {CHP DME IWVA:025713682}  Dressing  {CHP DME GBYV:546749381}  Toileting  {CHP DME LPVA:028621843}  Feeding  {[facility] DME IKVT:353739846}  Med Admin  {[facility] DME PDGD:238265086}  Med Delivery   { LOUIE MED Delivery:041373255}    Wound Care Documentation and Therapy:        Elimination:  Continence:   · Bowel: {YES / KH:03383}  · Bladder: {YES / QE:83491}  Urinary Catheter: {Urinary Catheter:355049023}   Colostomy/Ileostomy/Ileal Conduit: {YES / AX:61082}       Date of Last BM: ***    Intake/Output Summary (Last 24 hours) at 4/11/2019 1051  Last data filed at 4/11/2019 0700  Gross per 24 hour   Intake 1425 ml   Output 650 ml   Net 775 ml     I/O last 3 completed shifts: In: 8895 [P.O.:120; I.V.:655;  Other:50; IV ECJSWIJFI:131]  Out: 0 [Urine:650]    Safety Concerns:     [employer] Safety Concerns:358019351}    Impairments/Disabilities:      [employer] Impairments/Disabilities:751995907}      Patient's personal belongings (please select all that are sent with patient):  {[facility] DME Belongings:745038572}    RN SIGNATURE:  {Esignature:033504484}    CASE MANAGEMENT/SOCIAL WORK SECTION    Inpatient Status Date: ***    Readmission Risk Assessment Score:  Readmission Risk              Risk of Unplanned Readmission:        16           Discharging to Facility/ Agency   · Name:   · Address:  · Phone:  · Fax:    Dialysis Facility (if applicable)   · Name:  · Address:  · Dialysis

## 2019-04-11 NOTE — PROGRESS NOTES
IVPB  500 mg Intravenous TID    folic acid  1 mg Oral Daily    therapeutic multivitamin-minerals  1 tablet Oral Daily    sertraline  25 mg Oral Daily    sodium chloride flush  10 mL Intravenous 2 times per day    enoxaparin  40 mg Subcutaneous Daily    amLODIPine  5 mg Oral Daily    gabapentin  300 mg Oral TID    nystatin  500,000 Units Oral 4x Daily      Infusions:    sodium chloride 75 mL/hr at 04/10/19 1456    sodium chloride       PRN Meds:     acetaminophen 650 mg Q4H PRN   potassium chloride 40 mEq PRN   Or     potassium alternative oral replacement 40 mEq PRN   Or     potassium chloride 10 mEq PRN   hydrOXYzine 25 mg TID PRN   sodium chloride flush 10 mL ONCE PRN   traZODone 50 mg Nightly PRN   sodium chloride flush 10 mL PRN   magnesium hydroxide 30 mL Daily PRN   ondansetron 4 mg Q6H PRN         Electronically signed by Leola Melendez MD on 4/10/2019 at 10:54 PM

## 2019-04-11 NOTE — PROGRESS NOTES
[address] [place] OF SPEECH/LANGUAGE PATHOLOGY  DAILY PROGRESS NOTE  Bobby Trinidad  4/11/2019  [id number]  UTI (urinary tract infection) [N39.0]  Pneumonia due to organism [J18.9]  Generalized weakness [R53.1]  Acute cystitis with hematuria [N30.01]  Allergies   Allergen Reactions    Amoxicillin-Pot Clavulanate Rash         Pt was seen this date for dysphagia treatment. IMPRESSION AND RECOMMENDATIONS:   Bobby Trinidad was seen for a bedside swallowing treatment and diet tolerance monitoring. Pt was alert and pleasantly confused throughout assessment. She was positioned upright in bed and accepted limited PO trials of puree and thin liquids by straw sips. Adequate oral manipulation with mild lingual residue was observed with trials of puree. Pharyngeal swallow appeared delayed with adequate laryngeal elevation. Clear vocal quality and 0 overt s/s of aspiration were observed with all PO trials given. Discussed with pt and pt's daughter the option of a pureed diet because pt does not want to chew food d/t oral pain. Pt is refusing pureed foods at this time, however pt's daughter states she will continue to order softer foods off the menu for her to try. Recommend continue puree diet/thin liquids with strict aspiration precautions. ST will continue to follow Bobby Trinidad for diet tolerance monitoring. GOALS (current status in bold):  Short-term Goals  Timeframe for Short-term Goals: 1 week  Goal 1: Pt will tolerate Regular diet/Thin liquids (pt/family to choose soft foods pt feels she can tolerate) without clinical evidence for aspiration 100% Goal partially met, continue to monitor   Goal 2: Pt will participate in MBSS as indicated.  DNT- pt continues to be confused and requires max cues to participate                            EDUCATION: recommendations/POC    PAIN RATING (0-10 Scale): denies   Time in/Time out: SLP Individual Minutes  Time In: 1515  Time Out: 1545  Minutes: 30    Visit number: [address], [place], CCC-SLP, 4/11/2019   3:42 PM

## 2019-04-11 NOTE — CARE COORDINATION
Call to Pt dgtr. Informed her that [facility] and [facility] are out of network for Pt. LSW to try [facility] [employer] [facility]. Electronically faxed face sheet to [place]. Called and left voice message.  Electronically signed by NEELIMA Garza on 4/11/2019 at 3:56 PM

## 2019-04-11 NOTE — CONSULTS
Neurology Service Consult Note  [address] [place] [address]  Patient Name: Vanessa Evans  : 1955        Subjective:   Reason for consult: confusion   62 y.o. right-handed female past medical history of hyperlipidemia, hypertension and obesity, presenting to [address] [place] [address] with complaints of confusion, being found down from a fall by patient's daughter, patient lives in a older person community were her daughter watches her, she falls consistently each day, she is impulsive, and she has been having increasingly worsening confusion throughout the last 7 days, patient was estimated to be down for about 5 hours, is not sure exactly how she fell but patient's daughters at the bedside states the patient's balance is off, since being admitted she's been confused, she has not slept for 5 days, and she's been consistently talking about politics that are not current, she keeps asking about the election. He only has not noticed any facial droop, aphasia, dysarthria unilateral arm or leg weakness. Patient's daughter at the bedside states the last year they've noticed mom has \"dementia\", they're not sure where this came from, but do appreciate a progressive decline specifically in relationship to mobility and falls, states that she does hit her head frequently. Patient's family also reports that mom is bed bound her chair bound consistently throughout the day and she has frequent UTIs. 2 they've not seen a neurologist.  Patient is on aspirin prior to arrival.    There is a concern about the amount of alcohol patient is drinking with her neighbors, the daughter lives with her did instruct the neighbors to stop giving her alcohol if it makes her fall even more, however there was a strong history of alcohol abuse in the past.  He is on vitamin B1 home.      at the bedside me is restless, disoriented, she has difficulty with attention and concentration of her dyspnea and exam, she does deny chest pain shortness of breath, and again has no Alcohol use: Yes     Comment: 2-4 times a week    Drug use: No    Sexual activity: Not on file   Lifestyle    Physical activity:     Days per week: Not on file     Minutes per session: Not on file    Stress: Not on file   Relationships    Social connections:     Talks on phone: Not on file     Gets together: Not on file     Attends Mu-ism service: Not on file     Active member of club or organization: Not on file     Attends meetings of clubs or organizations: Not on file     Relationship status: Not on file    Intimate partner violence:     Fear of current or ex partner: Not on file     Emotionally abused: Not on file     Physically abused: Not on file     Forced sexual activity: Not on file   Other Topics Concern    Not on file   Social History Narrative    Not on file      Family History   Problem Relation Age of Onset    Diabetes Mother     Cancer Father     Other Sister        Review of Symptoms:    10-point system review completed. All of which are negative except as mentioned above. Physical Exam:           Gen: Alert and awake   HEENT: NC/AT, EOMI, PERRL, mmm, no carotid bruits, neck supple, no meningeal signs; Heart: Regular   Lungs: no distress  Ext: 1+ edema, no calf tenderness b/l  Psych: normal mood and affect  Skin: no rashes or lesions    NEUROLOGIC EXAM:    Mental Status: Alert and awake, she is oriented only to herself, she has concentration and attention difficulty, she is apraxic , she refuses to name and repeat for me     Cranial Nerve Exam:   CN II-XII: PERRL, VFF, no nystagmus, no gaze paresis, sensation V1-V3 intact b/l, muscles of facial expression symmetric; hearing intact to conversational tone, palate elevates symmetrically, shoulder elevation symmetric and tongue protrudes midline with movement side to side.     Motor Exam:       Antigravity x4  She has BUE cogwheel rigidity     Deep Tendon Reflexes: 1/4 biceps, triceps, brachioradialis, patellar, and achilles b/l;

## 2019-04-11 NOTE — CARE COORDINATION
LSW reviewed chart/PT/OT recommended SNF placement. Per Pt chart/ED notes, Pt and dgtr would like for Pt to go to [facility]). Referral faxed and called into [facility]. Electronically signed by NEELIMA Foster on 4/11/2019 at 10:53 AM    2:30 - Pt insurance is not in network with  [address]. Referral called into Pt [address] [place] [address].      Electronically signed by NEELIMA Foster on 4/11/2019 at 2:33 PM

## 2019-04-12 ENCOUNTER — APPOINTMENT (OUTPATIENT)
Dept: GENERAL RADIOLOGY | Age: 64
DRG: 689 | End: 2019-04-12
Payer: MEDICARE

## 2019-04-12 ENCOUNTER — APPOINTMENT (OUTPATIENT)
Dept: ULTRASOUND IMAGING | Age: 64
DRG: 689 | End: 2019-04-12
Payer: MEDICARE

## 2019-04-12 LAB
ACANTHOCYTES: ABNORMAL
ALBUMIN SERPL-MCNC: 2.6 GM/DL (ref 3.4–5)
ALP BLD-CCNC: 72 IU/L (ref 40–129)
ALT SERPL-CCNC: 10 U/L (ref 10–40)
ANION GAP SERPL CALCULATED.3IONS-SCNC: 16 MMOL/L (ref 4–16)
AST SERPL-CCNC: 13 IU/L (ref 15–37)
BANDED NEUTROPHILS ABSOLUTE COUNT: 2.07 K/CU MM
BANDED NEUTROPHILS RELATIVE PERCENT: 23 % (ref 5–11)
BASE EXCESS: ABNORMAL (ref 0–2.4)
BILIRUB SERPL-MCNC: 0.2 MG/DL (ref 0–1)
BILIRUBIN DIRECT: 0.2 MG/DL (ref 0–0.3)
BILIRUBIN, INDIRECT: 0 MG/DL (ref 0–0.7)
BUN BLDV-MCNC: 13 MG/DL (ref 6–23)
BURR CELLS: ABNORMAL
CALCIUM SERPL-MCNC: 7.4 MG/DL (ref 8.3–10.6)
CHLORIDE BLD-SCNC: 101 MMOL/L (ref 99–110)
CO2: 17 MMOL/L (ref 21–32)
COMMENT: ABNORMAL
CREAT SERPL-MCNC: 1.4 MG/DL (ref 0.6–1.1)
DIFFERENTIAL TYPE: ABNORMAL
EOSINOPHILS ABSOLUTE: 0.2 K/CU MM
EOSINOPHILS RELATIVE PERCENT: 2 % (ref 0–3)
GFR AFRICAN AMERICAN: 46 ML/MIN/1.73M2
GFR NON-AFRICAN AMERICAN: 38 ML/MIN/1.73M2
GLUCOSE BLD-MCNC: 97 MG/DL (ref 70–99)
HAV IGM SER IA-ACNC: NON REACTIVE
HCO3 VENOUS: 17.9 MMOL/L (ref 19–25)
HCT VFR BLD CALC: 29.1 % (ref 37–47)
HEMOGLOBIN: 8.5 GM/DL (ref 12.5–16)
HEPATITIS B CORE IGM ANTIBODY: NON REACTIVE
HEPATITIS B SURFACE ANTIGEN: NON REACTIVE
HEPATITIS C ANTIBODY: NON REACTIVE
LACTIC ACID, SEPSIS: 1.7 MMOL/L (ref 0.5–1.9)
LACTIC ACID, SEPSIS: ABNORMAL MMOL/L (ref 0.5–1.9)
LYMPHOCYTES ABSOLUTE: 0.7 K/CU MM
LYMPHOCYTES RELATIVE PERCENT: 8 % (ref 24–44)
MACROCYTES: ABNORMAL
MAGNESIUM: 1.8 MG/DL (ref 1.8–2.4)
MCH RBC QN AUTO: 31 PG (ref 27–31)
MCHC RBC AUTO-ENTMCNC: 29.2 % (ref 32–36)
MCV RBC AUTO: 106.2 FL (ref 78–100)
MONOCYTES ABSOLUTE: 0.2 K/CU MM
MONOCYTES RELATIVE PERCENT: 2 % (ref 0–4)
O2 SAT, VEN: 94 % (ref 50–70)
PCO2, VEN: 40 MMHG (ref 38–52)
PDW BLD-RTO: 14.6 % (ref 11.7–14.9)
PH VENOUS: 7.26 (ref 7.32–7.42)
PHOSPHORUS: 3.7 MG/DL (ref 2.5–4.9)
PLATELET # BLD: 262 K/CU MM (ref 140–440)
PMV BLD AUTO: 9.4 FL (ref 7.5–11.1)
PO2, VEN: 97 MMHG (ref 28–48)
POLYCHROMASIA: ABNORMAL
POTASSIUM SERPL-SCNC: 3.3 MMOL/L (ref 3.5–5.1)
RBC # BLD: 2.74 M/CU MM (ref 4.2–5.4)
SEGMENTED NEUTROPHILS ABSOLUTE COUNT: 5.8 K/CU MM
SEGMENTED NEUTROPHILS RELATIVE PERCENT: 65 % (ref 36–66)
SODIUM BLD-SCNC: 134 MMOL/L (ref 135–145)
TOTAL PROTEIN: 4.7 GM/DL (ref 6.4–8.2)
TOXIC GRANULATION: PRESENT
WBC # BLD: 9 K/CU MM (ref 4–10.5)
WBC # BLD: ABNORMAL 10*3/UL

## 2019-04-12 PROCEDURE — 2580000003 HC RX 258: Performed by: INTERNAL MEDICINE

## 2019-04-12 PROCEDURE — 80074 ACUTE HEPATITIS PANEL: CPT

## 2019-04-12 PROCEDURE — 83605 ASSAY OF LACTIC ACID: CPT

## 2019-04-12 PROCEDURE — 36415 COLL VENOUS BLD VENIPUNCTURE: CPT

## 2019-04-12 PROCEDURE — 2060000000 HC ICU INTERMEDIATE R&B

## 2019-04-12 PROCEDURE — 2580000003 HC RX 258: Performed by: NURSE PRACTITIONER

## 2019-04-12 PROCEDURE — 94761 N-INVAS EAR/PLS OXIMETRY MLT: CPT

## 2019-04-12 PROCEDURE — 85027 COMPLETE CBC AUTOMATED: CPT

## 2019-04-12 PROCEDURE — 86689 HTLV/HIV CONFIRMJ ANTIBODY: CPT

## 2019-04-12 PROCEDURE — 6360000002 HC RX W HCPCS: Performed by: INTERNAL MEDICINE

## 2019-04-12 PROCEDURE — 2500000003 HC RX 250 WO HCPCS: Performed by: INTERNAL MEDICINE

## 2019-04-12 PROCEDURE — 6370000000 HC RX 637 (ALT 250 FOR IP): Performed by: INTERNAL MEDICINE

## 2019-04-12 PROCEDURE — 82805 BLOOD GASES W/O2 SATURATION: CPT

## 2019-04-12 PROCEDURE — C1751 CATH, INF, PER/CENT/MIDLINE: HCPCS

## 2019-04-12 PROCEDURE — 86160 COMPLEMENT ANTIGEN: CPT

## 2019-04-12 PROCEDURE — 80053 COMPREHEN METABOLIC PANEL: CPT

## 2019-04-12 PROCEDURE — 76937 US GUIDE VASCULAR ACCESS: CPT

## 2019-04-12 PROCEDURE — 02HV33Z INSERTION OF INFUSION DEVICE INTO SUPERIOR VENA CAVA, PERCUTANEOUS APPROACH: ICD-10-PCS | Performed by: INTERNAL MEDICINE

## 2019-04-12 PROCEDURE — 6370000000 HC RX 637 (ALT 250 FOR IP): Performed by: FAMILY MEDICINE

## 2019-04-12 PROCEDURE — 83735 ASSAY OF MAGNESIUM: CPT

## 2019-04-12 PROCEDURE — 84100 ASSAY OF PHOSPHORUS: CPT

## 2019-04-12 PROCEDURE — 86320 SERUM IMMUNOELECTROPHORESIS: CPT

## 2019-04-12 PROCEDURE — 87040 BLOOD CULTURE FOR BACTERIA: CPT

## 2019-04-12 PROCEDURE — 71045 X-RAY EXAM CHEST 1 VIEW: CPT

## 2019-04-12 PROCEDURE — 76775 US EXAM ABDO BACK WALL LIM: CPT

## 2019-04-12 PROCEDURE — 82248 BILIRUBIN DIRECT: CPT

## 2019-04-12 PROCEDURE — 84165 PROTEIN E-PHORESIS SERUM: CPT

## 2019-04-12 PROCEDURE — 2580000003 HC RX 258

## 2019-04-12 PROCEDURE — 85007 BL SMEAR W/DIFF WBC COUNT: CPT

## 2019-04-12 PROCEDURE — 36569 INSJ PICC 5 YR+ W/O IMAGING: CPT

## 2019-04-12 RX ORDER — SODIUM CHLORIDE 0.9 % (FLUSH) 0.9 %
10 SYRINGE (ML) INJECTION PRN
Status: DISCONTINUED | OUTPATIENT
Start: 2019-04-12 | End: 2019-04-13 | Stop reason: HOSPADM

## 2019-04-12 RX ORDER — SODIUM CHLORIDE 9 MG/ML
INJECTION, SOLUTION INTRAVENOUS CONTINUOUS
Status: DISCONTINUED | OUTPATIENT
Start: 2019-04-12 | End: 2019-04-13 | Stop reason: HOSPADM

## 2019-04-12 RX ORDER — 0.9 % SODIUM CHLORIDE 0.9 %
250 INTRAVENOUS SOLUTION INTRAVENOUS ONCE
Status: DISCONTINUED | OUTPATIENT
Start: 2019-04-12 | End: 2019-04-13 | Stop reason: HOSPADM

## 2019-04-12 RX ORDER — 0.9 % SODIUM CHLORIDE 0.9 %
1000 INTRAVENOUS SOLUTION INTRAVENOUS ONCE
Status: COMPLETED | OUTPATIENT
Start: 2019-04-12 | End: 2019-04-12

## 2019-04-12 RX ORDER — THIAMINE HYDROCHLORIDE 100 MG/ML
250 INJECTION, SOLUTION INTRAMUSCULAR; INTRAVENOUS DAILY
Status: DISCONTINUED | OUTPATIENT
Start: 2019-04-12 | End: 2019-04-12

## 2019-04-12 RX ORDER — SODIUM CHLORIDE 0.9 % (FLUSH) 0.9 %
10 SYRINGE (ML) INJECTION EVERY 12 HOURS SCHEDULED
Status: DISCONTINUED | OUTPATIENT
Start: 2019-04-12 | End: 2019-04-13 | Stop reason: HOSPADM

## 2019-04-12 RX ORDER — LIDOCAINE HYDROCHLORIDE 10 MG/ML
5 INJECTION, SOLUTION EPIDURAL; INFILTRATION; INTRACAUDAL; PERINEURAL ONCE
Status: COMPLETED | OUTPATIENT
Start: 2019-04-12 | End: 2019-04-12

## 2019-04-12 RX ORDER — 0.9 % SODIUM CHLORIDE 0.9 %
500 INTRAVENOUS SOLUTION INTRAVENOUS ONCE
Status: DISCONTINUED | OUTPATIENT
Start: 2019-04-12 | End: 2019-04-13 | Stop reason: HOSPADM

## 2019-04-12 RX ORDER — THIAMINE HYDROCHLORIDE 100 MG/ML
250 INJECTION, SOLUTION INTRAMUSCULAR; INTRAVENOUS DAILY
Status: DISCONTINUED | OUTPATIENT
Start: 2019-04-13 | End: 2019-04-12

## 2019-04-12 RX ORDER — THIAMINE HYDROCHLORIDE 100 MG/ML
500 INJECTION, SOLUTION INTRAMUSCULAR; INTRAVENOUS ONCE
Status: DISCONTINUED | OUTPATIENT
Start: 2019-04-12 | End: 2019-04-12

## 2019-04-12 RX ADMIN — GABAPENTIN 300 MG: 300 CAPSULE ORAL at 16:03

## 2019-04-12 RX ADMIN — THIAMINE HYDROCHLORIDE 500 MG: 100 INJECTION, SOLUTION INTRAMUSCULAR; INTRAVENOUS at 16:02

## 2019-04-12 RX ADMIN — FOLIC ACID 1 MG: 1 TABLET ORAL at 10:46

## 2019-04-12 RX ADMIN — ENOXAPARIN SODIUM 40 MG: 40 INJECTION SUBCUTANEOUS at 10:47

## 2019-04-12 RX ADMIN — GABAPENTIN 300 MG: 300 CAPSULE ORAL at 06:26

## 2019-04-12 RX ADMIN — ACETAMINOPHEN 650 MG: 325 TABLET ORAL at 16:20

## 2019-04-12 RX ADMIN — GABAPENTIN 300 MG: 300 CAPSULE ORAL at 23:57

## 2019-04-12 RX ADMIN — SODIUM CHLORIDE 1000 ML: 9 INJECTION, SOLUTION INTRAVENOUS at 16:04

## 2019-04-12 RX ADMIN — SODIUM CHLORIDE: 9 INJECTION, SOLUTION INTRAVENOUS at 06:26

## 2019-04-12 RX ADMIN — SODIUM CHLORIDE: 9 INJECTION, SOLUTION INTRAVENOUS at 18:27

## 2019-04-12 RX ADMIN — LIDOCAINE HYDROCHLORIDE 5 ML: 10 INJECTION, SOLUTION EPIDURAL; INFILTRATION; INTRACAUDAL; PERINEURAL at 14:54

## 2019-04-12 RX ADMIN — SODIUM CHLORIDE 1000 ML: 9 INJECTION, SOLUTION INTRAVENOUS at 21:10

## 2019-04-12 RX ADMIN — TRAZODONE HYDROCHLORIDE 50 MG: 50 TABLET ORAL at 23:57

## 2019-04-12 RX ADMIN — FLUCONAZOLE 100 MG: 100 TABLET ORAL at 10:47

## 2019-04-12 RX ADMIN — ACETAMINOPHEN 650 MG: 325 TABLET ORAL at 23:57

## 2019-04-12 RX ADMIN — MULTIPLE VITAMINS W/ MINERALS TAB 1 TABLET: TAB at 10:47

## 2019-04-12 RX ADMIN — NYSTATIN 500000 UNITS: 100000 SUSPENSION ORAL at 16:13

## 2019-04-12 RX ADMIN — MEROPENEM 1 G: 1 INJECTION, POWDER, FOR SOLUTION INTRAVENOUS at 11:51

## 2019-04-12 RX ADMIN — THIAMINE HYDROCHLORIDE 500 MG: 100 INJECTION, SOLUTION INTRAMUSCULAR; INTRAVENOUS at 01:01

## 2019-04-12 ASSESSMENT — PAIN SCALES - GENERAL
PAINLEVEL_OUTOF10: 3
PAINLEVEL_OUTOF10: 4

## 2019-04-12 ASSESSMENT — PAIN DESCRIPTION - FREQUENCY: FREQUENCY: CONTINUOUS

## 2019-04-12 ASSESSMENT — PAIN - FUNCTIONAL ASSESSMENT: PAIN_FUNCTIONAL_ASSESSMENT: ACTIVITIES ARE NOT PREVENTED

## 2019-04-12 ASSESSMENT — PAIN DESCRIPTION - ONSET: ONSET: ON-GOING

## 2019-04-12 ASSESSMENT — PAIN DESCRIPTION - ORIENTATION: ORIENTATION: RIGHT;LEFT

## 2019-04-12 ASSESSMENT — PAIN DESCRIPTION - LOCATION: LOCATION: LEG

## 2019-04-12 ASSESSMENT — PAIN DESCRIPTION - PAIN TYPE: TYPE: CHRONIC PAIN

## 2019-04-12 ASSESSMENT — PAIN DESCRIPTION - DESCRIPTORS: DESCRIPTORS: ACHING

## 2019-04-12 NOTE — PROGRESS NOTES
Pt has been agitated throughout the entire night. Pt did not sleep more than 10 minutes this entire 12 hour shift. Pt is constantly pulling gown and telemetry leads off. Pt moves in the bed nonstop. The minute she is repositioned she fidgets and moves typically supine leaning to the left side. Pt buttocks is reddened, this RN applied barrier cream to her buttocks x 2 times.

## 2019-04-12 NOTE — CONSULTS
Nephrology Service Consultation    Patient:  Minna Narvaez  MRN: [id number]  Consulting physician:  Debora Garcia MD  Reason for Consult:   MANOJ    History Obtained From:  Family members  PCP: Solomon Ocampo MD    HISTORY OF PRESENT ILLNESS:   The patient is a 62 y.o. female who presented to the ED on 4/9  Weakness and 3 falls in the last two weeks. Family member   Has reported confusion and hallucinations preceding her   Recent hospitalization. Patient was not hypotensive when  She presented to the ED and her initial serum creatinines   Were at baseline until today. Family states that her confusion   Has seemed to have worsened over the last two days.       REVIEW OF SYSTEMS:  Unable to perform   due to patient's current impaired sensorium     Medical History:  Fatty Liver Disease / 20 year history   Of alcohol dependence (discontinued 2 weeks ago)   Urinary Incontinence (since December 2018) / HTN   Peripheral Neuropathy / Mood disorder     Surgical History:  lumbar surgery    Renal History:  No known history of nephrolithiasis  No previous RRT / previous history of recurrent UTI's  Baseline creatinine: 0.8 - 1.0    Medications:   Scheduled Meds:   sodium chloride  250 mL Intravenous Once    thiamine (VITAMIN B1) IVPB  500 mg Intravenous Once    [START ON 4/13/2019] thiamine (VITAMIN B1) IVPB  250 mg Intravenous Q24H    meropenem  1 g Intravenous Q12H    sodium chloride flush  10 mL Intravenous 2 times per day    sodium chloride  500 mL Intravenous Once    fluconazole  100 mg Oral Daily    folic acid  1 mg Oral Daily    therapeutic multivitamin-minerals  1 tablet Oral Daily    sodium chloride flush  10 mL Intravenous 2 times per day    enoxaparin  40 mg Subcutaneous Daily    gabapentin  300 mg Oral TID    nystatin  500,000 Units Oral 4x Daily     Continuous Infusions:   sodium chloride      sodium chloride 75 mL/hr at 04/10/19 1456    sodium chloride Stopped (04/12/19 1053) PRN Meds:.sodium chloride flush, melatonin, acetaminophen, potassium chloride **OR** potassium alternative oral replacement **OR** potassium chloride, hydrOXYzine, sodium chloride flush, traZODone, sodium chloride flush, magnesium hydroxide, ondansetron    Allergies:  Amoxicillin-pot clavulanate    SOCIAL HISTORY:   Tobacco: recently discontinued smoking    Alcohol: longstanding alcohol consumption; No alcohol consumption in the last two weeks. Demographic History:  Resides at home alone  With family within close proximity     Family History:       Problem Relation Age of Onset    Diabetes Mother     Cancer Father     Other Sister          Physical Exam:    Vitals: BP (!) 92/58   Pulse 74   Temp 98.4 °F (36.9 °C) (Oral)   Resp 15   Ht 5' (1.524 m)   Wt 173 lb (78.5 kg)   LMP 02/07/2012   SpO2 98%   BMI 33.79 kg/m²      General appearance: awake, confused in general   -able to identify current president   HEENT: Head: Normal, normocephalic, atraumatic. Neck: supple, symmetrical, trachea midline  Cardiovascular: S1 and S2: normal / no rub  Pulmonary: diminished lung sounds bilaterally  Abdomen:  soft / non-tender   Extremities:  + edema to the bilateral lower legs     CBC:   Recent Labs     04/10/19  0912 04/11/19  0611 04/12/19  1127   WBC 10.5 8.4 9.0   HGB 9.6* 9.9* 8.5*    277 262     BMP:    Recent Labs     04/10/19  0912 04/11/19  0611 04/12/19  0629    137 134*   K 3.2* 3.7 3.3*    107 101   CO2 27 17* 17*   BUN 15 10 13   CREATININE 0.7 0.8 1.4*   GLUCOSE 119* 110* 97     Hepatic:   Recent Labs     04/09/19  1330   AST 15   ALT 5*   BILITOT 0.5   ALKPHOS 71     Troponin: No results for input(s): TROPONINI in the last 72 hours.   Mg, Phos:   Recent Labs     04/10/19  0912 04/11/19  0611 04/12/19  0629   MG 0.9* 2.3 1.8   PHOS 2.6 2.2* 3.7       ABGs:   Lab Results   Component Value Date    PO2ART 80 09/24/2018    NBU4UMN 33.0 09/24/2018     INR: No results for input(s): INR in the last 72 hours. -----------------------------------------------------------------  Patient Active Problem List   Diagnosis Code    Tobacco use disorder F17.200    Dysphagia R13.10    Fatigue R53.83    Anemia C20.8    Complicated UTI (urinary tract infection) N39.0    Pneumonia J18.9    Encephalopathy acute G93.40    Essential hypertension I10    UTI (urinary tract infection) N39.0    Severe malnutrition (HCC) E43         Assessment and Recommendations     Impression   1. MANOJ   -pre-renal vs. ATI: consider multi-factorial etiology:   Hypotension with diminished perfusion of kidneys /   Systemic infection / exposure to nephrotoxic agents   Including imaging contrast     2. Hypotension / possible sepsis  / UTI   3. Metabolic Encephalopathy   4. Weakness with multiple falls   5. Hypokalemia   6. Hypophosphatemia   7. Anemia / MDS     PLAN   1.   -significant albuminuria on UA from 4/9/2019 without hematuria   -labs ordered: UPC and serologies / acute hepatitis panel / HIV   -renal US ordered to better characterize the kidneys which   May suggest etiology for MANOJ or CKD findings. -patient has urinary incontinence which may make quantifying  Urine output difficult. -no need for acute RRT at this time.     2.   -recent systolic BP's: 84 - 798; routine BP meds on hold   -blood and urine culture results pending   -currently on meropenem   3.   -monitor affect and sensorium   -consider discontinuation of meds likely to affect CNS   4.   -December Neurology notes were suggestive of possible Dementia  Or Binh Tre / Silvia Ask in context to prolonged ETOH use  -on thiamine supplementation   5.   -on potassium replacement protocol   -recheck serum K in am   6.   -normalization of phosphorous  7.   -latest Hb: 8.5; monitor Hb trend / consider other cause of anemia  -previous oncology note reflects family choice of   Aranesp for anemia treatment     Electronically signed by FRANKLIN March - NOMI

## 2019-04-12 NOTE — PROGRESS NOTES
Nutrition Assessment    Type and Reason for Visit: Reassess    Nutrition Recommendations: Continue diet as tolerates with high calorie supplement offered   Assist with meals     Nutrition Assessment: Remains high nutrition risk with inadequate oral intake. Noted eval by speech therapy and recommended pureed diet to assist with intake with sore mouth. Wanted to remain on general diet but self select softer foods. Malnutrition Assessment:  · Malnutrition Status: Meets the criteria for severe malnutrition  · Context: Acute illness or injury  · Findings of the 6 clinical characteristics of malnutrition (Minimum of 2 out of 6 clinical characteristics is required to make the diagnosis of moderate or severe Protein Calorie Malnutrition based on AND/ASPEN Guidelines):  1. Energy Intake-Less than or equal to 50% of estimated energy requirement, Greater than or equal to 7 days    2. Weight Loss-10% loss or greater, in 3 months  3.  Fat Loss-Mild subcutaneous fat loss, Orbital    Nutrition Risk Level: High    Nutrient Needs:  · Estimated Daily Total Kcal: 4444-3109 (20-25 jim/kg current weigh)  · Estimated Daily Protein (g): 58-63 (1.3-1.4 g/kg IBW)   · Estimated Daily Total Fluid (ml/day): 1700 (1 ml/jim)    Nutrition Diagnosis:   · Problem: Severe malnutrition, In context of acute illness or injury  · Etiology: related to Other (Comment), Difficulty swallowing(poor appetite )     Signs and symptoms:  as evidenced by Diet history of poor intake, Weight loss greater than or equal to 7.5% in 3 months    Objective Information:  · Nutrition-Focused Physical Findings: receiving care on visit   · Wound Type: None  · Current Nutrition Therapies:  · Oral Diet Orders: General   · Oral Diet intake: Unable to assess  · Oral Nutrition Supplement (ONS) Orders: Standard High Calorie Oral Supplement  · ONS intake: Unable to assess  · Anthropometric Measures:  · Ht: 5' (152.4 cm)   · Current Body Wt: 173 lb 1 oz (78.5 kg)(bed scale )  · % Weight Change:  ,  progressive weight loss 60# in 6 months, recent loss more than 10% in past 3 months   · Ideal Body Wt: 100 lb (45.4 kg), % Ideal Body 151  · BMI Classification: BMI 25.0 - 29.9 Overweight(BMI-29. 6)    Nutrition Interventions:   Continue current diet, Continue current ONS  Continued Inpatient Monitoring, Feeding Assistance/Environment Change    Nutrition Evaluation:   · Evaluation: No progress toward goals   · Goals: Patient will tolerate diet to consume at least 50-75% at meals during stay    · Monitoring: Meal Intake, Supplement Intake, Weight, Pertinent Labs, Diet Tolerance, Mental Status/Confusion      Electronically signed by Salima Collado RD, LD on 4/12/19 at 3:27 PM    Contact Number: [phone]

## 2019-04-12 NOTE — PROGRESS NOTES
Hospitalist Progress Note      Name:  Bobby Trinidad /Age/Sex: 1955  (62 y.o. female)   MRN & CSN:  [id number] & [id number] Admission Date/Time: 2019 12:55 PM   Location:  [address] [place] [address]- PCP: Lloyd Pineda MD         Hospital Day: 3    Assessment and Plan:   Bobby Trinidad is a 62 y.o.  female  who presents with:    Weakness with multiple falls  -unable to walk in ER  -could be due to UTI and dehydration  -will hydrate and treat UTI     Abnormal UA - with perineal irritation - likely UTI  -will treat with IV abx  -noted hx ESBL E coli and prior cx from September  -IV Bactrim  -CT urogram is negative  -follow up urine culture results     Confusion and hallucinations reported by family - metabolic encephalopathy  -could be due to UTI  -per neurology note in December dementia or Wernicke- Korsakoff syndrome was questioned  -could also be due to alcohol use as she was drinking recently     Alcohol use disorder   -thiamine - high dose     Sore mouth - probable oral thrush  -continue Nystatin which was started as an outpatient     Poor oral intake for a few weeks due to reported regurgitation  -hydrate     Hypokalemia with K level 3.0  -supplement    Hypomagnesemia - mag level 0.9 - replace     Low risk MDS - per oncology note family chose darbopoetin option - she sees Dr. Jw Méndez     History of liver disease per chart  -unconfirmed. I did not that [address] in 2018 showed diffuse fatty infiltration of the liver. HTN - amlodipine     Tob use     Body mass index is 29.49 kg/m². History of Present Illness:     Daughter from [place] is visitng    No dyspnea at rest    Objective:        Intake/Output Summary (Last 24 hours) at 2019  Last data filed at 2019 0700  Gross per 24 hour   Intake 1425 ml   Output 650 ml   Net 775 ml      Vitals:   Vitals:    19 1800   BP: 116/85   Pulse: 74   Resp: 16   Temp: 98.5 °F (36.9 °C)   SpO2:      Physical Exam:       Physical Exam   Constitutional: She appears well-developed. Pulmonary/Chest: Effort normal.   Skin: Skin is warm.          Medications:   Medications:    sulfamethoxazole-trimethoprim (BACTRIM) IVPB  160 mg Intravenous Q12H    fluconazole  100 mg Oral Daily    thiamine (VITAMIN B1) IVPB  500 mg Intravenous TID    folic acid  1 mg Oral Daily    therapeutic multivitamin-minerals  1 tablet Oral Daily    sertraline  25 mg Oral Daily    sodium chloride flush  10 mL Intravenous 2 times per day    enoxaparin  40 mg Subcutaneous Daily    amLODIPine  5 mg Oral Daily    gabapentin  300 mg Oral TID    nystatin  500,000 Units Oral 4x Daily      Infusions:    sodium chloride 75 mL/hr at 04/10/19 1456    sodium chloride 75 mL/hr at 04/11/19 1156     PRN Meds:     melatonin 3 mg Nightly PRN   acetaminophen 650 mg Q4H PRN   potassium chloride 40 mEq PRN   Or     potassium alternative oral replacement 40 mEq PRN   Or     potassium chloride 10 mEq PRN   hydrOXYzine 25 mg TID PRN   sodium chloride flush 10 mL ONCE PRN   traZODone 50 mg Nightly PRN   sodium chloride flush 10 mL PRN   magnesium hydroxide 30 mL Daily PRN   ondansetron 4 mg Q6H PRN         Electronically signed by Rico Bagley MD on 4/11/2019 at 9:23 PM

## 2019-04-12 NOTE — PROGRESS NOTES
Patient arrived to the unit at this time. Patient is alert but confused will answer to name. Came up to for sepsis waiting for bed at [facility]. Patients daughter is at bedside. Skin sweep was completed. Excoriation on bottom. Patients Vs are stable. Patient has a PICC in right arm. Patient has open sores in her mouth.  Patients temp is 96.3

## 2019-04-12 NOTE — CARE COORDINATION
Voice mail received [facility]. They will not have a bed available until next week. 9:15 - call to [facility]. They accept Pt insurance. Faxed facesheet to SNF.      Electronically signed by NEELIMA Bolivar on 4/12/2019 at 9:21 AM

## 2019-04-12 NOTE — PROGRESS NOTES
Patient being transferred to [facility]  We will sign off yet our recommendations still remain. Thank you  Shasha Nevarez CNP

## 2019-04-12 NOTE — PROGRESS NOTES
Hospitalist Progress Note      Name:  Minna Narvaez /Age/Sex: 1955  (62 y.o. female)   MRN & CSN:  [id number] & [id number] Admission Date/Time: 2019 12:55 PM   Location:  [address] [place] [address] PCP: Solomon Ocampo MD         Hospital Day: 4    Assessment and Plan:   Minna Narvaez is a 62 y.o.  female  who presents with:      Hypotension noted  with possible sepsis - change Bactrim to meropenem    Acute renal failure noted  - dc Bactrim, start meropenem    Weakness with multiple falls  -unable to walk in ER  -could be due to UTI and dehydration     UTI with perineal irriation  -noted hx ESBL E coli and prior cx from September  -CT urogram is negative  -follow up urine culture results     Confusion and hallucinations reported by family - metabolic encephalopathy  -could be due to UTI  -per neurology note in December dementia or Wernicke- Korsakoff syndrome was questioned  -could also be due to alcohol use as she was drinking recently  -daughter concerned about Lewy Body dementia due to history of visual hallucinations and rigidity and tremors, recommended to her outpatient neurology follow up     Alcohol use disorder   -thiamine - high dose     Sore mouth - probable oral thrush  -continue Nystatin which was started as an outpatient     Poor oral intake for a few weeks due to reported regurgitation  -hydrate     Hypokalemia with K level 3.0  -supplement    Hypomagnesemia - mag level 0.9 - replace     Low risk MDS - per oncology note family chose darbopoetin option - she sees Dr. Steve Londono     History of liver disease per chart  -unconfirmed. I did not that [address] in 2018 showed diffuse fatty infiltration of the liver. HTN - amlodipine     Tob use     Body mass index is 29.49 kg/m².        History of Present Illness:     BP dropped today    Objective:     No intake or output data in the 24 hours ending 19 0941   Vitals:   Vitals:    19 0814   BP:    Pulse:    Resp:    Temp:    SpO2: 98% Physical Exam:       Physical Exam   Constitutional: She appears well-developed. Pulmonary/Chest: Effort normal.   Skin: Skin is warm.          Medications:   Medications:    sulfamethoxazole-trimethoprim (BACTRIM) IVPB  160 mg Intravenous Q12H    fluconazole  100 mg Oral Daily    thiamine (VITAMIN B1) IVPB  500 mg Intravenous TID    folic acid  1 mg Oral Daily    therapeutic multivitamin-minerals  1 tablet Oral Daily    sertraline  25 mg Oral Daily    sodium chloride flush  10 mL Intravenous 2 times per day    enoxaparin  40 mg Subcutaneous Daily    amLODIPine  5 mg Oral Daily    gabapentin  300 mg Oral TID    nystatin  500,000 Units Oral 4x Daily      Infusions:    sodium chloride 75 mL/hr at 04/10/19 1456    sodium chloride 75 mL/hr at 04/12/19 0626     PRN Meds:     melatonin 3 mg Nightly PRN   acetaminophen 650 mg Q4H PRN   potassium chloride 40 mEq PRN   Or     potassium alternative oral replacement 40 mEq PRN   Or     potassium chloride 10 mEq PRN   hydrOXYzine 25 mg TID PRN   sodium chloride flush 10 mL ONCE PRN   traZODone 50 mg Nightly PRN   sodium chloride flush 10 mL PRN   magnesium hydroxide 30 mL Daily PRN   ondansetron 4 mg Q6H PRN         Electronically signed by Milla Buckner MD on 4/12/2019 at 9:41 AM

## 2019-04-13 ENCOUNTER — APPOINTMENT (OUTPATIENT)
Dept: CT IMAGING | Age: 64
DRG: 689 | End: 2019-04-13
Payer: MEDICARE

## 2019-04-13 VITALS
OXYGEN SATURATION: 94 % | RESPIRATION RATE: 15 BRPM | SYSTOLIC BLOOD PRESSURE: 121 MMHG | BODY MASS INDEX: 33.96 KG/M2 | HEART RATE: 86 BPM | TEMPERATURE: 97.2 F | DIASTOLIC BLOOD PRESSURE: 103 MMHG | HEIGHT: 60 IN | WEIGHT: 173 LBS

## 2019-04-13 LAB
ANION GAP SERPL CALCULATED.3IONS-SCNC: 10 MMOL/L (ref 4–16)
BASE EXCESS: ABNORMAL (ref 0–2.4)
BASOPHILS ABSOLUTE: 0 K/CU MM
BASOPHILS RELATIVE PERCENT: 0.3 % (ref 0–1)
BUN BLDV-MCNC: 11 MG/DL (ref 6–23)
CALCIUM SERPL-MCNC: 7.1 MG/DL (ref 8.3–10.6)
CARBON MONOXIDE, BLOOD: 1.7 % (ref 0–5)
CHLORIDE BLD-SCNC: 109 MMOL/L (ref 99–110)
CO2 CONTENT: 14.6 MMOL/L (ref 19–24)
CO2: 17 MMOL/L (ref 21–32)
COMMENT: ABNORMAL
CREAT SERPL-MCNC: 1.2 MG/DL (ref 0.6–1.1)
CREATININE URINE: 126.3 MG/DL (ref 28–217)
CULTURE: ABNORMAL
DIFFERENTIAL TYPE: ABNORMAL
EOSINOPHILS ABSOLUTE: 0.4 K/CU MM
EOSINOPHILS RELATIVE PERCENT: 5.5 % (ref 0–3)
GFR AFRICAN AMERICAN: 55 ML/MIN/1.73M2
GFR NON-AFRICAN AMERICAN: 45 ML/MIN/1.73M2
GLUCOSE BLD-MCNC: 85 MG/DL (ref 70–99)
HCO3 ARTERIAL: 13.9 MMOL/L (ref 18–23)
HCT VFR BLD CALC: 28 % (ref 37–47)
HEMOGLOBIN: 8.1 GM/DL (ref 12.5–16)
IMMATURE NEUTROPHIL %: 0.5 % (ref 0–0.43)
LACTATE: 1.1 MMOL/L (ref 0.4–2)
LYMPHOCYTES ABSOLUTE: 0.6 K/CU MM
LYMPHOCYTES RELATIVE PERCENT: 9.6 % (ref 24–44)
Lab: ABNORMAL
MAGNESIUM: 1.7 MG/DL (ref 1.8–2.4)
MCH RBC QN AUTO: 31.8 PG (ref 27–31)
MCHC RBC AUTO-ENTMCNC: 28.9 % (ref 32–36)
MCV RBC AUTO: 109.8 FL (ref 78–100)
METHEMOGLOBIN ARTERIAL: 1.7 %
MONOCYTES ABSOLUTE: 0.3 K/CU MM
MONOCYTES RELATIVE PERCENT: 4.1 % (ref 0–4)
NUCLEATED RBC %: 0 %
O2 SATURATION: 95.6 % (ref 96–97)
PCO2 ARTERIAL: 23 MMHG (ref 32–45)
PDW BLD-RTO: 15 % (ref 11.7–14.9)
PH BLOOD: 7.39 (ref 7.34–7.45)
PHOSPHORUS: 3.2 MG/DL (ref 2.5–4.9)
PLATELET # BLD: 214 K/CU MM (ref 140–440)
PMV BLD AUTO: 9.8 FL (ref 7.5–11.1)
PO2 ARTERIAL: 96 MMHG (ref 75–100)
POTASSIUM SERPL-SCNC: 3.4 MMOL/L (ref 3.5–5.1)
PROT/CREAT RATIO, UR: ABNORMAL
RBC # BLD: 2.55 M/CU MM (ref 4.2–5.4)
SEGMENTED NEUTROPHILS ABSOLUTE COUNT: 5.1 K/CU MM
SEGMENTED NEUTROPHILS RELATIVE PERCENT: 80 % (ref 36–66)
SODIUM BLD-SCNC: 136 MMOL/L (ref 135–145)
SPECIMEN: ABNORMAL
TOTAL COLONY COUNT: ABNORMAL
TOTAL IMMATURE NEUTOROPHIL: 0.03 K/CU MM
TOTAL NUCLEATED RBC: 0 K/CU MM
TROPONIN T: <0.01 NG/ML
URINE TOTAL PROTEIN: 68.3 MG/DL
WBC # BLD: 6.3 K/CU MM (ref 4–10.5)

## 2019-04-13 PROCEDURE — 83735 ASSAY OF MAGNESIUM: CPT

## 2019-04-13 PROCEDURE — 94761 N-INVAS EAR/PLS OXIMETRY MLT: CPT

## 2019-04-13 PROCEDURE — 84100 ASSAY OF PHOSPHORUS: CPT

## 2019-04-13 PROCEDURE — 36600 WITHDRAWAL OF ARTERIAL BLOOD: CPT

## 2019-04-13 PROCEDURE — 82570 ASSAY OF URINE CREATININE: CPT

## 2019-04-13 PROCEDURE — 84484 ASSAY OF TROPONIN QUANT: CPT

## 2019-04-13 PROCEDURE — 2580000003 HC RX 258: Performed by: INTERNAL MEDICINE

## 2019-04-13 PROCEDURE — 80048 BASIC METABOLIC PNL TOTAL CA: CPT

## 2019-04-13 PROCEDURE — 84166 PROTEIN E-PHORESIS/URINE/CSF: CPT

## 2019-04-13 PROCEDURE — 6360000002 HC RX W HCPCS: Performed by: INTERNAL MEDICINE

## 2019-04-13 PROCEDURE — 36592 COLLECT BLOOD FROM PICC: CPT

## 2019-04-13 PROCEDURE — 74176 CT ABD & PELVIS W/O CONTRAST: CPT

## 2019-04-13 PROCEDURE — 84156 ASSAY OF PROTEIN URINE: CPT

## 2019-04-13 PROCEDURE — 99223 1ST HOSP IP/OBS HIGH 75: CPT | Performed by: PSYCHIATRY & NEUROLOGY

## 2019-04-13 PROCEDURE — 87389 HIV-1 AG W/HIV-1&-2 AB AG IA: CPT

## 2019-04-13 PROCEDURE — 83605 ASSAY OF LACTIC ACID: CPT

## 2019-04-13 PROCEDURE — 85025 COMPLETE CBC W/AUTO DIFF WBC: CPT

## 2019-04-13 PROCEDURE — 6370000000 HC RX 637 (ALT 250 FOR IP): Performed by: INTERNAL MEDICINE

## 2019-04-13 PROCEDURE — 2500000003 HC RX 250 WO HCPCS: Performed by: INTERNAL MEDICINE

## 2019-04-13 PROCEDURE — 71250 CT THORAX DX C-: CPT

## 2019-04-13 PROCEDURE — 82803 BLOOD GASES ANY COMBINATION: CPT

## 2019-04-13 PROCEDURE — 70450 CT HEAD/BRAIN W/O DYE: CPT

## 2019-04-13 RX ORDER — 0.9 % SODIUM CHLORIDE 0.9 %
500 INTRAVENOUS SOLUTION INTRAVENOUS ONCE
Status: COMPLETED | OUTPATIENT
Start: 2019-04-13 | End: 2019-04-13

## 2019-04-13 RX ORDER — GABAPENTIN 100 MG/1
100 CAPSULE ORAL 3 TIMES DAILY
Status: DISCONTINUED | OUTPATIENT
Start: 2019-04-13 | End: 2019-04-13 | Stop reason: HOSPADM

## 2019-04-13 RX ORDER — MAGNESIUM SULFATE IN WATER 40 MG/ML
2 INJECTION, SOLUTION INTRAVENOUS ONCE
Status: DISCONTINUED | OUTPATIENT
Start: 2019-04-13 | End: 2019-04-13 | Stop reason: HOSPADM

## 2019-04-13 RX ORDER — GUAIFENESIN 600 MG/1
600 TABLET, EXTENDED RELEASE ORAL 2 TIMES DAILY
Status: DISCONTINUED | OUTPATIENT
Start: 2019-04-13 | End: 2019-04-13 | Stop reason: HOSPADM

## 2019-04-13 RX ORDER — MIDODRINE HYDROCHLORIDE 5 MG/1
5 TABLET ORAL
Status: DISCONTINUED | OUTPATIENT
Start: 2019-04-13 | End: 2019-04-13 | Stop reason: HOSPADM

## 2019-04-13 RX ORDER — IPRATROPIUM BROMIDE AND ALBUTEROL SULFATE 2.5; .5 MG/3ML; MG/3ML
1 SOLUTION RESPIRATORY (INHALATION)
Status: DISCONTINUED | OUTPATIENT
Start: 2019-04-13 | End: 2019-04-13 | Stop reason: HOSPADM

## 2019-04-13 RX ADMIN — SODIUM CHLORIDE 500 ML: 9 INJECTION, SOLUTION INTRAVENOUS at 13:22

## 2019-04-13 RX ADMIN — SODIUM CHLORIDE: 9 INJECTION, SOLUTION INTRAVENOUS at 13:22

## 2019-04-13 RX ADMIN — MEROPENEM 1 G: 1 INJECTION, POWDER, FOR SOLUTION INTRAVENOUS at 00:06

## 2019-04-13 RX ADMIN — SODIUM CHLORIDE: 9 INJECTION, SOLUTION INTRAVENOUS at 03:50

## 2019-04-13 RX ADMIN — NOREPINEPHRINE BITARTRATE 2 MCG/MIN: 1 INJECTION INTRAVENOUS at 13:23

## 2019-04-13 RX ADMIN — NYSTATIN 500000 UNITS: 100000 SUSPENSION ORAL at 00:07

## 2019-04-13 NOTE — PROGRESS NOTES
Neurology Service Consult Note  [facility]  Patient Name: Ricardo Cobb  : 1955        Subjective:     Patient seen and examined  Transferred to ICU for worsening AMS and hypotension  There is report she has expressive aphasia yet when I see her she remains encephalopathic just as I saw her two days ago. No transfer to  as she became hemodynamically altered. She cannot name or repeat for me, but she moves all 4 extremities, she does know she is in a hospital and her neck is supple. No fever.  No seizure activity.        :     Medications:  Scheduled Meds:   gabapentin  100 mg Oral TID    midodrine  5 mg Oral TID WC    magnesium sulfate  2 g Intravenous Once    famotidine (PEPCID) injection  20 mg Intravenous Daily    ipratropium-albuterol  1 ampule Inhalation Q4H WA    sodium chloride  500 mL Intravenous Once    guaiFENesin  600 mg Oral BID    sodium chloride  250 mL Intravenous Once    thiamine (VITAMIN B1) IVPB  250 mg Intravenous Q24H    meropenem  1 g Intravenous Q12H    sodium chloride flush  10 mL Intravenous 2 times per day    sodium chloride  500 mL Intravenous Once    sodium chloride flush  10 mL Intravenous 2 times per day    fluconazole  100 mg Oral Daily    folic acid  1 mg Oral Daily    therapeutic multivitamin-minerals  1 tablet Oral Daily    sodium chloride flush  10 mL Intravenous 2 times per day    enoxaparin  40 mg Subcutaneous Daily    nystatin  500,000 Units Oral 4x Daily     Continuous Infusions:   norepinephrine 2 mcg/min (19 1323)    sodium chloride 100 mL/hr at 19 1322     PRN Meds:.sodium chloride flush, sodium chloride flush, melatonin, acetaminophen, potassium chloride **OR** potassium alternative oral replacement **OR** potassium chloride, hydrOXYzine, sodium chloride flush, traZODone, sodium chloride flush, magnesium hydroxide, ondansetron         Family History   Problem Relation Age of Onset    Diabetes Mother     Cancer Father     Other Sister Review of Symptoms:    Does not answer     Physical Exam:           Gen: Alert and awake   HEENT: NC/AT, EOMI, PERRL, mmm, no carotid bruits, neck supple, no meningeal signs; Heart: Regular   Lungs: no distress  Ext: 1+ edema, no calf tenderness b/l  Psych: normal mood and affect  Skin: no rashes or lesions    NEUROLOGIC EXAM:    Mental Status: Alert and awake, she is oriented only to herself, she has concentration and attention difficulty, she is apraxic , she refuses to name and repeat for me     Cranial Nerve Exam:   CN II-XII: PERRL, VFF, no nystagmus, no gaze paresis, sensation V1-V3 intact b/l, muscles of facial expression symmetric; hearing intact to conversational tone, palate elevates symmetrically, shoulder elevation symmetric and tongue protrudes midline with movement side to side. Motor Exam:       Antigravity x4  She has BUE cogwheel rigidity     Deep Tendon Reflexes: 1/4 biceps, triceps, brachioradialis, patellar, and achilles b/l; flexor plantar responses b/l    Sensation: Intact light touch/temp UE's/LE's b/l    Coordination/Cerebellum:       Tremors--none      Rapidly alternating movements: no dysdiadochokinesia b/l                Gait and stance:      Gait: deferred      LABS:     Recent Labs     04/11/19  0611 04/12/19  0629 04/12/19  1127 04/13/19  0410 04/13/19  1115   WBC 8.4  --  9.0  --  6.3    134*  --  136  --    K 3.7 3.3*  --  3.4*  --     101  --  109  --    CO2 17* 17*  --  17*  --    BUN 10 13  --  11  --    CREATININE 0.8 1.4*  --  1.2*  --    GLUCOSE 110* 97  --  85  --      Results for Glenn Gayle (MRN [id number]) as of 4/11/2019 13:50   Ref. Range 4/9/2019 20:51   Vitamin B-12 Latest Ref Range: 211 - 911 pg/ml 422.1     B1 pending     IMAGING:    CT head:  Non-acute    MRI brain 9/2018:  Cerebral atrophy.  Mild-to-moderate chronic small vessel ischemic changes. No acute brain parenchymal abnormality.        MRI brain pending  EEG pending ASSESSMENT/PLAN:     3 62year old female with acute AMS secondary to acute TME and concussion superimposed on acute UTI with BLE PN with suspicion for an underlying vascular dementia. No suspicion for acute central etiology or stroke/seizure. We will continue with the following plan of care:  1. Neuro Exam:  1. Disorientation, distracted  2. BUE mild cog wheel  3. Otherwise non-focal  2. Neurodiagnostics:  1. CT head non-acute   2. B1 pending   3. MRI brain pending  4. EEG pending    3. Medications:  1. melatonin 3mg nightly  2. B1 supplement   3. Okay to take asa and statin for stroke prevention   4. PT/OT/ST:  1. 24/7 monitoring post discharge  5. Follow up:  1. Will need full CNS work up in our office  2. Focus on good sleep and wake cycle  3. Avoid CNS altering medications  4. Will follow         Thank you for allowing us to participate in the care of your patient. If there are any questions regarding evaluation please feel free to contact us. FRANKLIN Berman CNP, 4/13/2019     Attending Note:  I have rounded on this patient with Maira Lopez CNP. I have reviewed the chart and we have discussed this case in detail. The patient was seen and examined by myself. Pertinent labs and imaging have been personally reviewed. Our findings and impressions were discussed with the patient. I concur with the Nurse Practioner's assessment and plan.     Sybil Cortes DO 4/13/2019 5:34 PM

## 2019-04-13 NOTE — PROGRESS NOTES
9. 9* 8.5* 8.1*    262 214     BMP:    Recent Labs     04/11/19  0611 04/12/19  0629 04/13/19  0410    134* 136   K 3.7 3.3* 3.4*    101 109   CO2 17* 17* 17*   BUN 10 13 11   CREATININE 0.8 1.4* 1.2*   GLUCOSE 110* 97 85     Hepatic:   Recent Labs     04/12/19  0629   AST 13*   ALT 10   BILITOT 0.2   ALKPHOS 72     Troponin: No results for input(s): TROPONINI in the last 72 hours. BNP: No results for input(s): BNP in the last 72 hours. Lipids: No results for input(s): CHOL, HDL in the last 72 hours. Invalid input(s): LDLCALCU  ABGs:   Lab Results   Component Value Date    PO2ART 96 04/13/2019    WIL0TJJ 23.0 04/13/2019     INR: No results for input(s): INR in the last 72 hours.   Renal Labs  Albumin:    Lab Results   Component Value Date    LABALBU 2.6 04/12/2019     Calcium:    Lab Results   Component Value Date    CALCIUM 7.1 04/13/2019     Phosphorus:    Lab Results   Component Value Date    PHOS 3.2 04/13/2019     U/A:    Lab Results   Component Value Date    NITRU NEGATIVE 04/09/2019    COLORU MATTHEW 04/09/2019    WBCUA 104 04/09/2019    RBCUA NONE SEEN 04/09/2019    MUCUS RARE 04/09/2019    TRICHOMONAS NONE SEEN 04/09/2019    YEAST RARE 09/17/2018    BACTERIA RARE 04/09/2019    CLARITYU HAZY 04/09/2019    SPECGRAV 1.015 04/09/2019    UROBILINOGEN 1 04/09/2019    BILIRUBINUR NEGATIVE 04/09/2019    BLOODU NEGATIVE 04/09/2019    KETUA SMALL 04/09/2019     ABG:    Lab Results   Component Value Date    PAV4HVL 23.0 04/13/2019    PO2ART 96 04/13/2019    SEP8EUD 13.9 04/13/2019     HgBA1c:    Lab Results   Component Value Date    LABA1C 5.5 01/24/2014     Microalbumen/Creatinine ratio:  No components found for: RUCREAT          Objective:   Vitals: BP (!) 66/43   Pulse 86   Temp 97.2 °F (36.2 °C) (Rectal)   Resp 15   Ht 5' (1.524 m)   Wt 173 lb (78.5 kg)   LMP 02/07/2012   SpO2 97%   BMI 33.79 kg/m²   General appearance: awake confused  HEENT: Head: Normal, normocephalic, atraumatic.   Neck: supple, symmetrical, trachea midline  Lungs: diminished breath sounds bilaterally  Heart: S1, S2 normal  Abdomen: abnormal findings:  soft nt  Extremities: edema trace  Neurologic: Mental status: alertness: awake      Patient Active Problem List:     Tobacco use disorder     Dysphagia     Fatigue     Anemia     Complicated UTI (urinary tract infection)     Pneumonia     Encephalopathy acute     Essential hypertension     UTI (urinary tract infection)     Severe malnutrition (HCC)    Assessment and Plan:      IMP:  arf from atn   Hypotension  uti possible urosepsis  Metabolic encephalopathy with hx etoh use  Electrolyte change  anemia    Plan     Renal slight increase but stable with + uop monitor  bp low today and levophed if need  On abx therapy monitir  Neuro follow and monitor for etiology  Replete K and phos stable  Monitor hb prbc if < 7  Possible UC transfer           Livier Gaines MD

## 2019-04-13 NOTE — PROGRESS NOTES
HOSPITALIST        SHOCK - unresponsive to fluids - transfer to ICU for pressors  -consult critical care  -cardiology consult to rule out any cardiac cause    ESBL E coli UTI - it is sensitive to both Bactrim and meropenem which she has received, so decompensation today not explained by untreated UTI    Progressive decline in mental status - repeat head CT STAT    S/P initiation of meropenem yesterday - confusion worse today - meropenem can cause this, consider switching to aminoglycoside or Bactrim (the only other 2 drugs listed in sensitivity report that E coli is sensitive to)    Gurgling - suction prn, watch airway    Meds: was unable to take them today

## 2019-04-13 NOTE — PROGRESS NOTES
Call initiated by: Nursing staff:  Eliana Bethea, RN  Call addressed around: 4/12/2019 9:01 PM      Reason for call: BP 71/57      Orders placed:  Bolus NS 1L, once        Trevor Moreno APRN - CNP

## 2019-04-13 NOTE — PROGRESS NOTES
Report called to ICU, Bi Coombs . All questions answered, verbalized understanding. Patient transferred to ICU room 2009 with all belongings, family at bedside, waiting in lobby to go back to room.

## 2019-04-13 NOTE — PROGRESS NOTES
HOSPITALIST    Spoke to ICU attending at [facility] - patient accepted, beds may be available later today.

## 2019-04-14 LAB
CULTURE: NORMAL
CULTURE: NORMAL
HIV SCREEN: NON REACTIVE
Lab: NORMAL
Lab: NORMAL
SPECIMEN: NORMAL
SPECIMEN: NORMAL

## 2019-04-14 NOTE — CONSULTS
[address] [place] [address], [facility]                                  CONSULTATION    PATIENT NAME: Devon Garcia                   :        1955  MED REC NO:   [id number]                          ROOM:       2109  ACCOUNT NO:   [de-identified]                           ADMIT DATE: 2019  PROVIDER:     Leonila Borrero MD    CONSULT DATE:  2019    HISTORY OF PRESENT ILLNESS:  The patient is a 79-year-old lady with a  history of hypertension, hyperlipidemia, who was admitted through the  emergency room with complaints of weakness. She fell three times in the  past two weeks. She was also having periods of confusion. She was  hypotensive in the emergency room. She had a CAT scan of the chest that  showed evidence of patchy opacities bilaterally consistent with  pneumonia. She had urinalysis, which is growing E. coli. She was  subsequently admitted to the intensive care unit. There is no history  of hemoptysis, hematemesis, nausea or vomiting. No history of chest  pains. PAST MEDICAL HISTORY:  Significant for hypertension and hyperlipidemia. PAST SURGICAL HISTORY:  Remarkable for back surgery. FAMILY HISTORY:  Reveals that her mother has diabetes. Father had  cancer. SOCIAL HISTORY:  Reveals that she smokes about half-pack per day and has  been smoking for 10 years. She drinks alcohol off and on. No history  of drug abuse. MEDICATIONS:  Reviewed. ALLERGIES:  She is allergic to AMOXICILLIN. REVIEW OF SYSTEMS:  A 10 to 14-point review of systems is reviewed and  is negative except for what is mentioned in the history of present  illness. PHYSICAL EXAMINATION:  GENERAL:  The patient is awake and responsive, in no acute respiratory  distress. VITAL SIGNS:  Her blood pressure was 85/74 mmHg, pulse of 87 per minute  and respiratory rate of 12 per minute. She is afebrile.   Her saturation  is 98%

## 2019-04-15 LAB
COMPLEMENT C3: 82 MG/DL (ref 88–201)
COMPLEMENT C3: ABNORMAL MG/DL (ref 88–201)
COMPLEMENT C4: 30 MG/DL (ref 10–40)
COMPLEMENT C4: NORMAL MG/DL (ref 10–40)

## 2019-04-16 NOTE — DISCHARGE SUMMARY
above  -patient was transferred to [place] [address] ICU downtown for further care. ID is not available this weekend.   Also, her daughter lives in [place]    CT chest today showed evidence of pneumonia, which I feel may be due to aspiration, which may have not been present on admission    SHOCK - unresponsive to fluids - transfer to ICU for pressors     ESBL E coli UTI - it is sensitive to both Bactrim and meropenem which she has received, so decompensation today not explained by untreated UTI     Acute renal failure noted 4/12 - dc Bactrim, started on meropenem     Weakness with multiple falls for 2 weeks prior to admission  -unable to walk in ER  -could be due to UTI and dehydration    Metabolic encephalopathy  -she was treated for possible Wernicke's encephalopathy with high dose thiamine, but it did not help    Severe malnutrition - in the setting of heavy alcohol use - appreciate dietary consult - continue to address this issue    Cognitive disorder   -her mental status has been declining for several months  -neurology consult appreciated - neurology suspects vascular dementia but patient will need full CNS workup in the office per neurology as well as outpatient EEG  -daughter is concerned about Lewy Body dementia due to history of visual hallucinations and rigidity and tremors, recommended to her outpatient neurology follow up     Alcohol use disorder   -she was drinking heavily with the neighbors up until a few weeks ago per her daughter - high dose thiamine used her did not help  -doubt alcohol withdrawal as her daughter stated she has not been drinking for at least 2 weeks     Sore mouth - probable oral thrush  -continue Nystatin which was started as an outpatient     Poor oral intake for a few weeks due to reported regurgitation  -hydrate     Hypokalemia with K level 3.0  -supplemented     Hypomagnesemia - mag level 0.9 - replaced     Low risk myelodysplasia - per oncology note family chose darbopoeitin option - she sees Dr. Wojciech Sepulveda of oncology, follow up with him     History of liver disease per chart  -unconfirmed. I did not that [address] in 2018 showed diffuse fatty infiltration of the liver. HTN - amlodipine, her home med has been held     Tob use - family did not think a nicotine patch would help     Body mass index is 29.49 kg/m². Chronic urinary incontinence - appreciate urology consult - follow up in the office    Small complex cyst within left kidney seen on renal US. This was not well evaluated due to US technique. Consider 1-year follow-up renal ultrasound. Consults this admission:  IP CONSULT TO HOSPITALIST  IP CONSULT TO UROLOGY  IP CONSULT TO NEUROLOGY  IP CONSULT TO NEPHROLOGY  IP CONSULT TO PULMONOLOGY  IP CONSULT TO CARDIOLOGY    Discharge Instruction:   Follow up appointments: transferred to [facility] ICU  Primary care physician:  within 2 weeks    Diet:  unable to take PO at this time   Activity: activity as tolerated  Disposition: Discharged to:   []Home, []C, []SNF, []Acute Rehab, []Hospice     [facility]  Condition on discharge: Stable    Home Medications:      Richard Márquez   Home Medication Instructions [facility]:[id number]    Printed on:04/16/19 2005   Medication Information                      amLODIPine (NORVASC) 5 MG tablet  Take 1 tablet by mouth daily             gabapentin (NEURONTIN) 300 MG capsule  Take 1 capsule by mouth 3 times daily for 30 days. [employer]              nystatin (MYCOSTATIN) 675418 UNIT/ML suspension  Take 5 mLs by mouth 4 times daily Swish and retain in mouth as long as possible-use 2 days after symptoms resolve             omeprazole (PRILOSEC) 40 MG delayed release capsule  Take 1 capsule by mouth daily             sertraline (ZOLOFT) 25 MG tablet  Take 1 tablet by mouth daily             traZODone (DESYREL) 50 MG tablet  TAKE ONE (1) TABLET BY MOUTH AT BEDTIME AS NEEDED FOR SLEEP             vitamin B-1 100 MG tablet  Take 1 tablet by mouth daily                 Objective Findings at Discharge:   BP (!) 121/103   Pulse 86   Temp 97.2 °F (36.2 °C) (Rectal)   Resp 15   Ht 5' (1.524 m)   Wt 173 lb (78.5 kg)   LMP 02/07/2012   SpO2 94%   BMI 33.79 kg/m²            PHYSICAL EXAM   GEN Awake female, sitting upright in bed in no apparent distress. Appears given age. LABS:    CBC:   Lab Results   Component Value Date    WBC 6.3 04/13/2019    HGB 8.1 04/13/2019    HCT 28.0 04/13/2019    .8 04/13/2019     04/13/2019     BMP:   Lab Results   Component Value Date     04/13/2019    K 3.4 04/13/2019     04/13/2019    CO2 17 04/13/2019    PHOS 3.2 04/13/2019    BUN 11 04/13/2019    CREATININE 1.2 04/13/2019    CALCIUM 7.1 04/13/2019       IMAGING:    XR CHEST PORTABLE [[id number]] Collected: 04/12/19 1354      Order Status: Completed Updated: 04/14/19 1509     Narrative:       EXAMINATION:  SINGLE XRAY VIEW OF THE CHEST    4/12/2019 12:23 pm    COMPARISON:  04/10/2019    HISTORY:  ORDERING SYSTEM PROVIDED HISTORY: hypotension  TECHNOLOGIST PROVIDED HISTORY:  Reason for exam:->hypotension  Ordering Physician Provided Reason for Exam: hypotension  Acuity: Acute  Type of Exam: Initial    FINDINGS:  The heart size appears mildly enlarged. There is no pneumothorax, edema or  focal consolidation. The bony thorax is grossly intact. Central vascular  congestion is improved. Impression:       Improved central vascular congestion. No new edema or consolidation. MRI BRAIN WO CONTRAST [[id number]]      Order Status: Canceled      CT ABDOMEN PELVIS WO CONTRAST Additional Contrast? None [[id number]] Collected: 04/13/19 1120     Order Status: Completed Updated: 04/13/19 1136     Narrative:       EXAMINATION:  CT OF THE CHEST WITHOUT CONTRAST; CT OF THE ABDOMEN AND PELVIS WITHOUT  CONTRAST 4/13/2019 10:49 am    TECHNIQUE:  CT of the chest was performed without the administration of intravenous  contrast. Multiplanar reformatted images are provided for review. gallbladder, pancreas, spleen and the bilateral  adrenal glands are otherwise within normal limits. There is a 1.2 cm left renal cyst, a benign finding, does not require  follow-up. There is hyperattenuation in the bilateral renal medulla, may be  related to medullary nephrocalcinosis or retained contrast.  There is no left  or right hydronephrosis or obstructive uropathy. GI/Bowel: There is no evidence of bowel obstruction or pneumoperitoneum. There is normal appendix without acute appendicitis. There is no evidence of  acute diverticulitis. Pelvis: The urinary bladder is decompressed by a James catheter. The pelvic  structures are grossly within normal limits. There is no evidence of free  pelvic fluid. Peritoneum/Retroperitoneum: There is no evidence of ascites or  pneumoperitoneum. The abdominal aorta is of normal size. There is no  evidence of mesenteric or retroperitoneal lymphadenopathy. Bones/Soft Tissues: There is no acute osseous abnormality. There is no acute  soft tissue abnormality. Impression:       Subtle patchy airspace opacities bilaterally, likely related to pneumonia. Bronchial wall thickening, likely related to small airway disease or  bronchiolitis. Mucous plugging. Minimal left pleural effusion. Mild cardiomegaly. Minimal pericardial effusion. Mildly prominent main pulmonary artery, may be related to mild pulmonary  hypertension. No acute abnormality in the abdomen or pelvis. Fatty liver disease.     Hyperattenuation in the bilateral renal medulla, may be related to medullary  nephrocalcinosis or retained contrast.     CT CHEST WO CONTRAST [[id number]] Collected: 04/13/19 1120     Order Status: Completed Updated: 04/13/19 1135     Narrative:       EXAMINATION:  CT OF THE CHEST WITHOUT CONTRAST; CT OF THE ABDOMEN AND PELVIS WITHOUT  CONTRAST 4/13/2019 10:49 am    TECHNIQUE:  CT of the chest was performed without the administration of intravenous  contrast. Multiplanar reformatted images are provided for review. Dose  modulation, iterative reconstruction, and/or weight based adjustment of the  mA/kV was utilized to reduce the radiation dose to as low as reasonably  achievable.; CT of the abdomen and pelvis was performed without the  administration of intravenous contrast. Multiplanar reformatted images are  provided for review. Dose modulation, iterative reconstruction, and/or weight  based adjustment of the mA/kV was utilized to reduce the radiation dose to as  low as reasonably achievable. COMPARISON:  CT chest November 26, 2008, CT urogram April 10, 2019    HISTORY:  ORDERING SYSTEM PROVIDED HISTORY: UNEXPLAINED SHOCK  TECHNOLOGIST PROVIDED HISTORY:  Ordering Physician Provided Reason for Exam: UNEXPLAINED SHOCK  Acuity: Acute  Type of Exam: Initial  Additional signs and symptoms: PATIENT UNABLE TO FOLLOW COMMANDS, PATIENT  RESTRAINED, BEST IMAGES OBTAINED  Relevant Medical/Surgical History: POOR HISTORIAN    FINDINGS:  CT chest:    Limited by motion artifacts. Mediastinum: There is a right-sided PICC line with its tip terminating at the  superior cavoatrial junction. The main pulmonary artery is mildly prominent,  may be related to mild pulmonary hypertension. The heart is mildly enlarged. There is minimal pericardial effusion. The ascending, descending thoracic  aorta are of normal size. There is no mediastinal or hilar lymphadenopathy. Lungs/pleura: There is subtle patchy airspace opacities bilaterally, likely  related to pneumonia. There is bronchial wall thickening, likely related to  small airway disease or bronchiolitis. There is likely mucous plugging. There is minimal left pleural effusion. There is bilateral mild dependent  atelectasis. There is no pneumothorax. Soft Tissues/Bones: There is no acute abnormality in the soft tissue or  osseous structures.     CT abdomen and pelvis:    Organs: Limited evaluation of the solid organs without IV contrast.  There is  fatty liver disease. The gallbladder, pancreas, spleen and the bilateral  adrenal glands are otherwise within normal limits. There is a 1.2 cm left renal cyst, a benign finding, does not require  follow-up. There is hyperattenuation in the bilateral renal medulla, may be  related to medullary nephrocalcinosis or retained contrast.  There is no left  or right hydronephrosis or obstructive uropathy. GI/Bowel: There is no evidence of bowel obstruction or pneumoperitoneum. There is normal appendix without acute appendicitis. There is no evidence of  acute diverticulitis. Pelvis: The urinary bladder is decompressed by a James catheter. The pelvic  structures are grossly within normal limits. There is no evidence of free  pelvic fluid. Peritoneum/Retroperitoneum: There is no evidence of ascites or  pneumoperitoneum. The abdominal aorta is of normal size. There is no  evidence of mesenteric or retroperitoneal lymphadenopathy. Bones/Soft Tissues: There is no acute osseous abnormality. There is no acute  soft tissue abnormality. Impression:       Subtle patchy airspace opacities bilaterally, likely related to pneumonia. Bronchial wall thickening, likely related to small airway disease or  bronchiolitis. Mucous plugging. Minimal left pleural effusion. Mild cardiomegaly. Minimal pericardial effusion. Mildly prominent main pulmonary artery, may be related to mild pulmonary  hypertension. No acute abnormality in the abdomen or pelvis. Fatty liver disease.     Hyperattenuation in the bilateral renal medulla, may be related to medullary  nephrocalcinosis or retained contrast.     CT HEAD WO CONTRAST [[id number]] Collected: 04/13/19 1120     Order Status: Completed Updated: 04/13/19 1124     Narrative:       EXAMINATION:  CT OF THE HEAD WITHOUT CONTRAST  4/13/2019 10:49 am    TECHNIQUE:  CT of the head was performed without the administration of intravenous  contrast. Dose modulation, iterative reconstruction, and/or weight based  adjustment of the mA/kV was utilized to reduce the radiation dose to as low  as reasonably achievable. COMPARISON:  04/09/2019    HISTORY:  ORDERING SYSTEM PROVIDED HISTORY: DIFFICULTY TALKING  TECHNOLOGIST PROVIDED HISTORY:  Has a \"code stroke\" or \"stroke alert\" been called? ->No  Ordering Physician Provided Reason for Exam: DIFFICULTY TALKING, AMS  Acuity: Acute  Type of Exam: Initial  Additional signs and symptoms: PATIENT UNABLE TO FOLLOW COMMANDS, PATIENT  RESTRAINED, BEST IMAGES OBTAINED  Relevant Medical/Surgical History: UNKNOWN, POOR HISTORIAN    FINDINGS:  Examination is suboptimal due to motion artifact. BRAIN/VENTRICLES: No acute intracranial hemorrhage, mass effect or midline  shift. No abnormal extra-axial fluid collection. The gray-white  differentiation is maintained without evidence of an acute infarct. Stable  prominence of the ventricles and sulci due to global parenchymal volume loss. Stable hypoattenuation within the periventricular and subcortical white  matter, which likely represent chronic microvascular ischemic change. ORBITS: The visualized portion of the orbits demonstrate no acute abnormality. SINUSES: The visualized paranasal sinuses and mastoid air cells demonstrate  no acute abnormality. SOFT TISSUES/SKULL: No acute abnormality of the visualized skull or soft  tissues. Impression:       Motion limited examination without gross evidence of acute intracranial  abnormality. US RETROPERITONEAL LIMITED [[id number]] Collected: 04/12/19 1412     Order Status: Completed Updated: 04/13/19 0927     Narrative:       EXAMINATION:  RETROPERITONEAL ULTRASOUND OF THE KIDNEYS AND URINARY BLADDER    4/12/2019    COMPARISON:  September 26, 2018.     HISTORY:  ORDERING SYSTEM PROVIDED HISTORY: RENAL FAILURE, ACUTE (KIDNEY INJURY)  TECHNOLOGIST PROVIDED HISTORY:  Ordering Physician Provided Reason for Exam: ARF  Acuity: Acute  Type of Exam: Initial  Additional signs and symptoms: nk  Relevant Medical/Surgical History: nk    FINDINGS:  The right kidney measures 9.3 cm in length and the left kidney measures 9.2  cm in length. Kidneys demonstrate normal cortical echogenicity. No evidence of  hydronephrosis or intrarenal stones. There is a 1.0 x 1.2 x 1.1 cm complex cyst within left kidney. This was not  well seen due to patient's body habitus. Impression:       1. No evidence of hydronephrosis. 2. Small complex cyst within left kidney. This was not well evaluated due to  technique. Consider 1-year follow-up renal ultrasound. XR CHEST 1 VW [[id number]] Updated: 04/12/19 1223     Order Status: Carmencita Sy [[id number]] Collected: 04/10/19 2143     Order Status: Completed Updated: 04/10/19 2151     Narrative:       EXAMINATION:  CT UROGRAM    4/10/2019 8:21 pm    TECHNIQUE:  CT of the abdomen and pelvis was performed before and after the  administration of intravenous contrast as per CT urogram protocol. Multiplanar reformatted images as well as MIP urogram images are provided for  review. Dose modulation, iterative reconstruction, and/or weight based  adjustment of the mA/kV was utilized to reduce the radiation dose to as low  as reasonably achievable. COMPARISON:  None. HISTORY:  ORDERING SYSTEM PROVIDED HISTORY: UTI, recurrent,  rule out urinary tract  abnormality  TECHNOLOGIST PROVIDED HISTORY:  Ordering Physician Provided Reason for Exam: UTI, recurrent,  rule out  urinary tract abnormality,,, hematuria  Acuity: Unknown  Type of Exam: Initial  Additional signs and symptoms: 88 cc isovue 370  Relevant Medical/Surgical History: hx back sx    FINDINGS:  Lower Chest:  Visualized portion of the lower chest demonstrates no acute  abnormality. Kidneys and Urinary Tract: There is a benign 1.4 cm cyst arising from the  lateral left kidney.   The kidneys are otherwise unremarkable. There is no  hydronephrosis, hydroureter, or urinary tract calculus. There is incomplete  filling of the renal collecting systems and ureters. No urothelial mass is  identified. Organs: There is diffuse hepatic hypoattenuation without focal hepatic  lesion. The gallbladder, biliary system, pancreas, spleen, and adrenal  glands are within normal limits. GI/Bowel: No acute bowel abnormality is evident. The appendix is normal.    Pelvis: The urinary bladder is decompressed by a James catheter. The uterus  and adnexal structures are within normal limits. Peritoneum/Retroperitoneum: There is no intraperitoneal free air or free  fluid. There is no pathologically enlarged or morphologically abnormal lymph  node. The abdominal aorta is normal in caliber. There is minimal scattered  calcific atherosclerotic plaque. Bones/Soft Tissues: There is no acute fracture or suspect osseous lesion. Soft tissues are unremarkable. There is moderate L5-S1 disc height loss with  intradiscal vacuum phenomenon and endplate sclerosis. There is a tiny locule  of gas in the left lateral recess at L4-5, which may be within a disc  protrusion or synovial cyst.     Impression:       1. No acute abdominopelvic abnormality. 2. No abnormality of the urinary tract to suggest etiology of recurrent  urinary tract infections. 3. Diffuse hepatic steatosis. XR CHEST PORTABLE [[id number]] Collected: 04/10/19 1139     Order Status: Completed Updated: 04/10/19 1231     Narrative:       EXAMINATION:  SINGLE XRAY VIEW OF THE CHEST    4/10/2019 10:22 am    COMPARISON:  04/09/2019.     HISTORY:  ORDERING SYSTEM PROVIDED HISTORY: cough  TECHNOLOGIST PROVIDED HISTORY:  Reason for exam:->cough  Ordering Physician Provided Reason for Exam: cough  Acuity: Acute  Type of Exam: Initial  Relevant Medical/Surgical History: hypertension    FINDINGS:  Overlying items external to the patient somewhat limit evaluation. Mild degree of pulmonary vascular congestion without overt pulmonary edema,  slightly worsened. No focal consolidations, pleural effusions or  pneumothoraces. Cardiac and mediastinal silhouettes are stable with mild  cardiomegaly. No acute bony abnormalities. Impression:       Mild pulmonary vascular congestion without overt pulmonary edema, slightly  worsened. Stable mild cardiomegaly. US RENAL COMPLETE [[id number]]      Order Status: Canceled      XR CHEST 1 VW [[id number]]      Order Status: Canceled      CT Head WO Contrast [[id number]] Collected: 04/09/19 1507     Order Status: Completed Updated: 04/09/19 1511     Narrative:       EXAMINATION:  CT OF THE HEAD WITHOUT CONTRAST  4/9/2019 2:59 pm    TECHNIQUE:  CT of the head was performed without the administration of intravenous  contrast. Dose modulation, iterative reconstruction, and/or weight based  adjustment of the mA/kV was utilized to reduce the radiation dose to as low  as reasonably achievable. COMPARISON:  MRI on 09/26/2018, CT on 09/24/2018    HISTORY:  Acute confusion. FINDINGS:  BRAIN/VENTRICLES: No acute intracranial hemorrhage, mass effect or midline  shift. No abnormal extra-axial fluid collection. The gray-white  differentiation is maintained without evidence of an acute infarct. No  hydrocephalus. Parenchymal volume loss with chronic small vessel ischemic  changes, unchanged. ORBITS: The visualized portion of the orbits demonstrate no acute  abnormality. Chronic fractures of the bilateral lamina papyracea. SINUSES: The visualized paranasal sinuses and mastoid air cells demonstrate  no acute abnormality. SOFT TISSUES/SKULL:  No acute abnormality of the visualized skull or soft  tissues. Impression:       No acute intracranial abnormality.      XR CHEST PORTABLE [[id number]] Collected: 04/09/19 1333     Order Status: Completed Updated: 04/09/19 1337     Narrative:       EXAMINATION:  SINGLE XRAY VIEW OF THE CHEST    4/9/2019 1:30 pm    COMPARISON:  09/24/2018. HISTORY:  ORDERING SYSTEM PROVIDED HISTORY: fatigue  TECHNOLOGIST PROVIDED HISTORY:  Reason for exam:->fatigue  Ordering Physician Provided Reason for Exam: chest pain  Acuity: Unknown  Type of Exam: Unknown    FINDINGS:  The heart size is within normal limits. The pulmonary vasculature is also  within normal limits. No acute infiltrates are seen. The costophrenic angles  are sharp bilaterally. No pneumothoraces are noted. Impression:       1. No active pulmonary disease.              Discharge Time of 35 minutes    Electronically signed by Milla Buckner MD on 4/16/2019 at 5:30 PM

## 2019-04-17 LAB
CULTURE: NORMAL
CULTURE: NORMAL
Lab: NORMAL
Lab: NORMAL
SPECIMEN: NORMAL
SPECIMEN: NORMAL

## 2019-04-18 LAB
HIV-1 WESTERN BLOT: ABNORMAL
HIV-1 WESTERN BLOT: ABNORMAL

## 2019-04-19 LAB
ALBUMIN ELP: 2.4 GM/DL (ref 3.2–5.6)
ALBUMIN, U: 49 %
ALPHA-1-GLOBULIN, U: 5 %
ALPHA-1-GLOBULIN: 0.3 GM/DL (ref 0.1–0.4)
ALPHA-2-GLOBULIN, U: 9 %
ALPHA-2-GLOBULIN: 0.6 GM/DL (ref 0.4–1.2)
BETA GLOBULIN, U: 19 %
BETA GLOBULIN: 0.8 GM/DL (ref 0.5–1.3)
GAMMA GLOBULIN, U: 19 %
GAMMA GLOBULIN: 0.9 GM/DL (ref 0.5–1.6)
SPEP INTERPRETATION: ABNORMAL
SPEP INTERPRETATION: NORMAL
SPEP INTERPRETATION: NORMAL
TOTAL PROTEIN: 5 GM/DL (ref 6.4–8.2)
URINE TOTAL PROTEIN: 68.3 MG/DL

## 2019-04-21 ASSESSMENT — ENCOUNTER SYMPTOMS
WHEEZING: 0
SINUS PRESSURE: 0
SORE THROAT: 0
SHORTNESS OF BREATH: 0
COUGH: 0
EYE DISCHARGE: 0

## 2019-05-07 ENCOUNTER — HOSPITAL ENCOUNTER (OUTPATIENT)
Age: 64
Setting detail: SPECIMEN
Discharge: HOME | End: 2019-05-07
Payer: COMMERCIAL

## 2019-05-07 LAB
ALBUMIN SERPL-MCNC: 3 GM/DL (ref 3.4–5)
ALP BLD-CCNC: 57 IU/L (ref 40–129)
ALT SERPL-CCNC: 21 U/L (ref 10–40)
AMMONIA: 34 UMOL/L (ref 11–51)
ANION GAP SERPL CALCULATED.3IONS-SCNC: 13 MMOL/L (ref 4–16)
APTT: 19.6 SECONDS (ref 21.2–33)
AST SERPL-CCNC: 38 IU/L (ref 15–37)
BILIRUB SERPL-MCNC: 0.2 MG/DL (ref 0–1)
BUN BLDV-MCNC: 28 MG/DL (ref 6–23)
CALCIUM SERPL-MCNC: 8.7 MG/DL (ref 8.3–10.6)
CHLORIDE BLD-SCNC: 108 MMOL/L (ref 99–110)
CO2: 25 MMOL/L (ref 21–32)
CREAT SERPL-MCNC: 1.1 MG/DL (ref 0.6–1.1)
GFR AFRICAN AMERICAN: >60 ML/MIN/1.73M2
GFR NON-AFRICAN AMERICAN: 50 ML/MIN/1.73M2
GLUCOSE BLD-MCNC: 94 MG/DL (ref 70–99)
HCT VFR BLD CALC: 28.7 % (ref 37–47)
HEMOGLOBIN: 8.7 GM/DL (ref 12.5–16)
INR BLD: 1.02 INDEX
MAGNESIUM: 1.3 MG/DL (ref 1.8–2.4)
MCH RBC QN AUTO: 29.4 PG (ref 27–31)
MCHC RBC AUTO-ENTMCNC: 30.3 % (ref 32–36)
MCV RBC AUTO: 97 FL (ref 78–100)
PDW BLD-RTO: 15.9 % (ref 11.7–14.9)
PLATELET # BLD: 247 K/CU MM (ref 140–440)
PMV BLD AUTO: 11.4 FL (ref 7.5–11.1)
POTASSIUM SERPL-SCNC: 3.4 MMOL/L (ref 3.5–5.1)
PROTHROMBIN TIME: 11.6 SECONDS (ref 9.12–12.5)
RBC # BLD: 2.96 M/CU MM (ref 4.2–5.4)
SODIUM BLD-SCNC: 146 MMOL/L (ref 135–145)
TOTAL PROTEIN: 6 GM/DL (ref 6.4–8.2)
TSH HIGH SENSITIVITY: 4.84 UIU/ML (ref 0.27–4.2)
VITAMIN B-12: 835.4 PG/ML (ref 211–911)
VITAMIN D 25-HYDROXY: 17.47 NG/ML
WBC # BLD: 12.1 K/CU MM (ref 4–10.5)

## 2019-05-07 PROCEDURE — 82607 VITAMIN B-12: CPT

## 2019-05-07 PROCEDURE — 36415 COLL VENOUS BLD VENIPUNCTURE: CPT

## 2019-05-07 PROCEDURE — 80053 COMPREHEN METABOLIC PANEL: CPT

## 2019-05-07 PROCEDURE — 83735 ASSAY OF MAGNESIUM: CPT

## 2019-05-07 PROCEDURE — 82306 VITAMIN D 25 HYDROXY: CPT

## 2019-05-07 PROCEDURE — 84443 ASSAY THYROID STIM HORMONE: CPT

## 2019-05-07 PROCEDURE — 82140 ASSAY OF AMMONIA: CPT

## 2019-05-07 PROCEDURE — 85730 THROMBOPLASTIN TIME PARTIAL: CPT

## 2019-05-07 PROCEDURE — 85610 PROTHROMBIN TIME: CPT

## 2019-05-07 PROCEDURE — 85027 COMPLETE CBC AUTOMATED: CPT

## 2019-05-09 ENCOUNTER — HOSPITAL ENCOUNTER (OUTPATIENT)
Age: 64
Setting detail: SPECIMEN
Discharge: HOME | End: 2019-05-09
Payer: MEDICARE

## 2019-05-09 PROBLEM — N39.0 UTI (URINARY TRACT INFECTION): Status: RESOLVED | Noted: 2019-04-09 | Resolved: 2019-05-09

## 2019-05-09 LAB
HCT VFR BLD CALC: 30.5 % (ref 37–47)
HEMOGLOBIN: 8.9 GM/DL (ref 12.5–16)
MCH RBC QN AUTO: 29.3 PG (ref 27–31)
MCHC RBC AUTO-ENTMCNC: 29.2 % (ref 32–36)
MCV RBC AUTO: 100.3 FL (ref 78–100)
PDW BLD-RTO: 16.1 % (ref 11.7–14.9)
PLATELET # BLD: 200 K/CU MM (ref 140–440)
PMV BLD AUTO: 11.8 FL (ref 7.5–11.1)
RBC # BLD: 3.04 M/CU MM (ref 4.2–5.4)
WBC # BLD: 9.1 K/CU MM (ref 4–10.5)

## 2019-05-09 PROCEDURE — 85027 COMPLETE CBC AUTOMATED: CPT

## 2019-05-09 PROCEDURE — 36415 COLL VENOUS BLD VENIPUNCTURE: CPT

## 2019-05-15 ENCOUNTER — TELEPHONE (OUTPATIENT)
Dept: FAMILY MEDICINE CLINIC | Age: 64
End: 2019-05-15

## 2019-05-15 NOTE — TELEPHONE ENCOUNTER
Patient's daughter called and stated that the patient is now in need of home care. Patient's daughter would like to know if you could please do this order?  Please advise daughter states the patient was just in the hospital

## 2019-05-16 ENCOUNTER — HOSPITAL ENCOUNTER (OUTPATIENT)
Age: 64
Setting detail: SPECIMEN
Discharge: HOME | End: 2019-05-16
Payer: COMMERCIAL

## 2019-07-17 ENCOUNTER — HOSPITAL ENCOUNTER (OUTPATIENT)
Age: 64
Setting detail: SPECIMEN
Discharge: HOME | End: 2019-07-17
Payer: COMMERCIAL

## 2019-07-17 LAB
BACTERIA: NEGATIVE /HPF
BILIRUBIN URINE: NEGATIVE MG/DL
BLOOD, URINE: NEGATIVE
CLARITY: CLEAR
COLOR: YELLOW
GLUCOSE, URINE: NEGATIVE MG/DL
KETONES, URINE: NEGATIVE MG/DL
LEUKOCYTE ESTERASE, URINE: NEGATIVE
MUCUS: ABNORMAL HPF
NITRITE URINE, QUANTITATIVE: NEGATIVE
PH, URINE: 6 (ref 5–8)
PROTEIN UA: NEGATIVE MG/DL
RBC URINE: 1 /HPF (ref 0–6)
SPECIFIC GRAVITY UA: 1.01 (ref 1–1.03)
TRICHOMONAS: ABNORMAL /HPF
UROBILINOGEN, URINE: NORMAL MG/DL (ref 0.2–1)
WBC UA: <1 /HPF (ref 0–5)

## 2019-07-17 PROCEDURE — 87086 URINE CULTURE/COLONY COUNT: CPT

## 2019-07-17 PROCEDURE — 81001 URINALYSIS AUTO W/SCOPE: CPT

## 2019-07-19 LAB
CULTURE: NORMAL
Lab: NORMAL
SPECIMEN: NORMAL

## 2019-07-24 ENCOUNTER — HOSPITAL ENCOUNTER (OUTPATIENT)
Age: 64
Setting detail: SPECIMEN
Discharge: HOME | End: 2019-07-24
Payer: COMMERCIAL

## 2019-07-24 PROCEDURE — 87324 CLOSTRIDIUM AG IA: CPT

## 2019-07-25 LAB
CLOSTRIDIUM DIFFICILE, PCR: ABNORMAL
CLOSTRIDIUM DIFFICILE, PCR: ABNORMAL